# Patient Record
Sex: FEMALE | Race: WHITE | NOT HISPANIC OR LATINO | Employment: FULL TIME | URBAN - METROPOLITAN AREA
[De-identification: names, ages, dates, MRNs, and addresses within clinical notes are randomized per-mention and may not be internally consistent; named-entity substitution may affect disease eponyms.]

---

## 2017-02-13 ENCOUNTER — OFFICE VISIT - HEALTHEAST (OUTPATIENT)
Dept: FAMILY MEDICINE | Facility: CLINIC | Age: 52
End: 2017-02-13

## 2017-02-13 DIAGNOSIS — F33.0 MAJOR DEPRESSIVE DISORDER, RECURRENT EPISODE, MILD (H): ICD-10-CM

## 2017-02-13 DIAGNOSIS — N95.1 PERI-MENOPAUSAL: ICD-10-CM

## 2017-02-13 DIAGNOSIS — F98.8 ATTENTION DEFICIT DISORDER: ICD-10-CM

## 2017-03-27 ENCOUNTER — RECORDS - HEALTHEAST (OUTPATIENT)
Dept: MAMMOGRAPHY | Facility: CLINIC | Age: 52
End: 2017-03-27

## 2017-03-27 DIAGNOSIS — Z12.31 ENCOUNTER FOR SCREENING MAMMOGRAM FOR MALIGNANT NEOPLASM OF BREAST: ICD-10-CM

## 2017-04-10 ENCOUNTER — HOSPITAL ENCOUNTER (OUTPATIENT)
Dept: ULTRASOUND IMAGING | Facility: CLINIC | Age: 52
Discharge: HOME | End: 2017-04-10
Attending: NURSE PRACTITIONER

## 2017-04-10 DIAGNOSIS — N63.0 BREAST MASS: ICD-10-CM

## 2017-04-21 ENCOUNTER — OFFICE VISIT - HEALTHEAST (OUTPATIENT)
Dept: FAMILY MEDICINE | Facility: CLINIC | Age: 52
End: 2017-04-21

## 2017-04-21 ENCOUNTER — RECORDS - HEALTHEAST (OUTPATIENT)
Dept: GENERAL RADIOLOGY | Facility: CLINIC | Age: 52
End: 2017-04-21

## 2017-04-21 DIAGNOSIS — M25.562 PAIN IN LEFT KNEE: ICD-10-CM

## 2017-04-21 DIAGNOSIS — R00.2 HEART PALPITATIONS: ICD-10-CM

## 2017-04-21 DIAGNOSIS — R53.83 FATIGUE: ICD-10-CM

## 2017-04-21 DIAGNOSIS — M25.562 LEFT KNEE PAIN: ICD-10-CM

## 2017-04-21 LAB
CHOLEST SERPL-MCNC: 223 MG/DL
FASTING STATUS PATIENT QL REPORTED: YES
HDLC SERPL-MCNC: 75 MG/DL
LDLC SERPL CALC-MCNC: 134 MG/DL
TRIGL SERPL-MCNC: 72 MG/DL

## 2017-04-21 ASSESSMENT — MIFFLIN-ST. JEOR: SCORE: 1211.86

## 2017-04-24 ENCOUNTER — COMMUNICATION - HEALTHEAST (OUTPATIENT)
Dept: FAMILY MEDICINE | Facility: CLINIC | Age: 52
End: 2017-04-24

## 2017-05-03 LAB
ATRIAL RATE - MUSE: 78 BPM
DIASTOLIC BLOOD PRESSURE - MUSE: NORMAL MMHG
INTERPRETATION ECG - MUSE: NORMAL
P AXIS - MUSE: 65 DEGREES
PR INTERVAL - MUSE: 122 MS
QRS DURATION - MUSE: 72 MS
QT - MUSE: 372 MS
QTC - MUSE: 424 MS
R AXIS - MUSE: 47 DEGREES
SYSTOLIC BLOOD PRESSURE - MUSE: NORMAL MMHG
T AXIS - MUSE: 34 DEGREES
VENTRICULAR RATE- MUSE: 78 BPM

## 2017-06-09 ENCOUNTER — COMMUNICATION - HEALTHEAST (OUTPATIENT)
Dept: FAMILY MEDICINE | Facility: CLINIC | Age: 52
End: 2017-06-09

## 2017-06-09 DIAGNOSIS — F98.8 ATTENTION DEFICIT DISORDER: ICD-10-CM

## 2017-07-12 ENCOUNTER — COMMUNICATION - HEALTHEAST (OUTPATIENT)
Dept: FAMILY MEDICINE | Facility: CLINIC | Age: 52
End: 2017-07-12

## 2017-07-12 DIAGNOSIS — F33.0 MAJOR DEPRESSIVE DISORDER, RECURRENT EPISODE, MILD (H): ICD-10-CM

## 2017-10-11 ENCOUNTER — COMMUNICATION - HEALTHEAST (OUTPATIENT)
Dept: FAMILY MEDICINE | Facility: CLINIC | Age: 52
End: 2017-10-11

## 2017-10-11 DIAGNOSIS — F98.8 ATTENTION DEFICIT DISORDER: ICD-10-CM

## 2017-12-18 ENCOUNTER — COMMUNICATION - HEALTHEAST (OUTPATIENT)
Dept: FAMILY MEDICINE | Facility: CLINIC | Age: 52
End: 2017-12-18

## 2017-12-18 DIAGNOSIS — F33.0 MAJOR DEPRESSIVE DISORDER, RECURRENT EPISODE, MILD (H): ICD-10-CM

## 2017-12-28 ENCOUNTER — OFFICE VISIT - HEALTHEAST (OUTPATIENT)
Dept: FAMILY MEDICINE | Facility: CLINIC | Age: 52
End: 2017-12-28

## 2017-12-28 DIAGNOSIS — J30.9 ALLERGIC RHINITIS: ICD-10-CM

## 2017-12-28 DIAGNOSIS — F33.0 MAJOR DEPRESSIVE DISORDER, RECURRENT EPISODE, MILD (H): ICD-10-CM

## 2017-12-28 DIAGNOSIS — F98.8 ATTENTION DEFICIT DISORDER: ICD-10-CM

## 2017-12-28 ASSESSMENT — MIFFLIN-ST. JEOR: SCORE: 1189.18

## 2017-12-31 LAB
AMPHETAMINE GC/MS CONF: 3628 NG/ML
INTERPRETATION: NORMAL
MDA (ECSTASY METABOLITE)-BY GC/MS: NEGATIVE NG/ML
MDMA (ECSTASY)-BY GC/MS: NEGATIVE NG/ML
METHAMPHETAMINE GC/MS CONF: NEGATIVE NG/ML
PHENTERMINE-BY GC/MS: NEGATIVE NG/ML
PSEUDOEPHEDRINE/EPHEDRINE-BY GC/MS: NEGATIVE NG/ML

## 2018-04-03 ENCOUNTER — RECORDS - HEALTHEAST (OUTPATIENT)
Dept: MAMMOGRAPHY | Facility: CLINIC | Age: 53
End: 2018-04-03

## 2018-04-03 DIAGNOSIS — Z12.31 ENCOUNTER FOR SCREENING MAMMOGRAM FOR MALIGNANT NEOPLASM OF BREAST: ICD-10-CM

## 2018-05-10 ENCOUNTER — OFFICE VISIT - HEALTHEAST (OUTPATIENT)
Dept: FAMILY MEDICINE | Facility: CLINIC | Age: 53
End: 2018-05-10

## 2018-05-10 DIAGNOSIS — J06.9 URI (UPPER RESPIRATORY INFECTION): ICD-10-CM

## 2018-05-10 DIAGNOSIS — F98.8 ATTENTION DEFICIT DISORDER: ICD-10-CM

## 2018-05-25 ENCOUNTER — OFFICE VISIT - HEALTHEAST (OUTPATIENT)
Dept: FAMILY MEDICINE | Facility: CLINIC | Age: 53
End: 2018-05-25

## 2018-05-25 DIAGNOSIS — N89.8 VAGINAL ITCHING: ICD-10-CM

## 2018-05-25 DIAGNOSIS — R21 RASH: ICD-10-CM

## 2018-05-25 LAB
CLUE CELLS: NORMAL
TRICHOMONAS, WET PREP: NORMAL
YEAST, WET PREP: NORMAL

## 2018-05-25 ASSESSMENT — MIFFLIN-ST. JEOR: SCORE: 1193.87

## 2018-06-01 ENCOUNTER — COMMUNICATION - HEALTHEAST (OUTPATIENT)
Dept: SCHEDULING | Facility: CLINIC | Age: 53
End: 2018-06-01

## 2018-07-17 ENCOUNTER — COMMUNICATION - HEALTHEAST (OUTPATIENT)
Dept: FAMILY MEDICINE | Facility: CLINIC | Age: 53
End: 2018-07-17

## 2018-07-17 DIAGNOSIS — F33.0 MAJOR DEPRESSIVE DISORDER, RECURRENT EPISODE, MILD (H): ICD-10-CM

## 2018-10-02 ENCOUNTER — OFFICE VISIT - HEALTHEAST (OUTPATIENT)
Dept: FAMILY MEDICINE | Facility: CLINIC | Age: 53
End: 2018-10-02

## 2018-10-02 DIAGNOSIS — F98.8 ATTENTION DEFICIT DISORDER: ICD-10-CM

## 2018-10-02 DIAGNOSIS — F33.0 MAJOR DEPRESSIVE DISORDER, RECURRENT EPISODE, MILD (H): ICD-10-CM

## 2018-10-02 DIAGNOSIS — Z79.899 CONTROLLED SUBSTANCE AGREEMENT SIGNED: ICD-10-CM

## 2018-10-03 ENCOUNTER — COMMUNICATION - HEALTHEAST (OUTPATIENT)
Dept: OBGYN | Facility: CLINIC | Age: 53
End: 2018-10-03

## 2018-10-03 ENCOUNTER — COMMUNICATION - HEALTHEAST (OUTPATIENT)
Dept: LAB | Facility: CLINIC | Age: 53
End: 2018-10-03

## 2018-10-03 DIAGNOSIS — F98.8 ATTENTION DEFICIT DISORDER: ICD-10-CM

## 2019-01-25 ENCOUNTER — COMMUNICATION - HEALTHEAST (OUTPATIENT)
Dept: FAMILY MEDICINE | Facility: CLINIC | Age: 54
End: 2019-01-25

## 2019-01-25 ENCOUNTER — OFFICE VISIT - HEALTHEAST (OUTPATIENT)
Dept: FAMILY MEDICINE | Facility: CLINIC | Age: 54
End: 2019-01-25

## 2019-01-25 ENCOUNTER — AMBULATORY - HEALTHEAST (OUTPATIENT)
Dept: FAMILY MEDICINE | Facility: CLINIC | Age: 54
End: 2019-01-25

## 2019-01-25 DIAGNOSIS — N63.20 LEFT BREAST LUMP: ICD-10-CM

## 2019-01-25 ASSESSMENT — MIFFLIN-ST. JEOR: SCORE: 1153.1

## 2019-01-29 ENCOUNTER — RECORDS - HEALTHEAST (OUTPATIENT)
Dept: ADMINISTRATIVE | Facility: OTHER | Age: 54
End: 2019-01-29

## 2019-02-11 ENCOUNTER — COMMUNICATION - HEALTHEAST (OUTPATIENT)
Dept: FAMILY MEDICINE | Facility: CLINIC | Age: 54
End: 2019-02-11

## 2019-02-11 DIAGNOSIS — F98.8 ATTENTION DEFICIT DISORDER: ICD-10-CM

## 2019-02-12 ENCOUNTER — OFFICE VISIT - HEALTHEAST (OUTPATIENT)
Dept: FAMILY MEDICINE | Facility: CLINIC | Age: 54
End: 2019-02-12

## 2019-02-12 ENCOUNTER — COMMUNICATION - HEALTHEAST (OUTPATIENT)
Dept: FAMILY MEDICINE | Facility: CLINIC | Age: 54
End: 2019-02-12

## 2019-02-12 DIAGNOSIS — F98.8 ATTENTION DEFICIT DISORDER: ICD-10-CM

## 2019-02-12 LAB
AMPHETAMINES UR QL SCN: ABNORMAL
BARBITURATES UR QL: ABNORMAL
BENZODIAZ UR QL: ABNORMAL
CANNABINOIDS UR QL SCN: ABNORMAL
COCAINE UR QL: ABNORMAL
CREAT UR-MCNC: 148.9 MG/DL
METHADONE UR QL SCN: ABNORMAL
OPIATES UR QL SCN: ABNORMAL
OXYCODONE UR QL: ABNORMAL
PCP UR QL SCN: ABNORMAL

## 2019-02-12 ASSESSMENT — MIFFLIN-ST. JEOR: SCORE: 1166.53

## 2019-03-15 ENCOUNTER — COMMUNICATION - HEALTHEAST (OUTPATIENT)
Dept: FAMILY MEDICINE | Facility: CLINIC | Age: 54
End: 2019-03-15

## 2019-03-15 DIAGNOSIS — F98.8 ATTENTION DEFICIT DISORDER: ICD-10-CM

## 2019-03-20 ENCOUNTER — OFFICE VISIT - HEALTHEAST (OUTPATIENT)
Dept: FAMILY MEDICINE | Facility: CLINIC | Age: 54
End: 2019-03-20

## 2019-03-20 DIAGNOSIS — J30.81 ALLERGIC RHINITIS DUE TO ANIMALS: ICD-10-CM

## 2019-03-20 DIAGNOSIS — N87.9 DYSPLASIA OF CERVIX: ICD-10-CM

## 2019-03-20 DIAGNOSIS — F33.0 MAJOR DEPRESSIVE DISORDER, RECURRENT EPISODE, MILD (H): ICD-10-CM

## 2019-03-20 DIAGNOSIS — Z00.00 ROUTINE GENERAL MEDICAL EXAMINATION AT A HEALTH CARE FACILITY: ICD-10-CM

## 2019-03-20 DIAGNOSIS — F90.0 ATTENTION DEFICIT HYPERACTIVITY DISORDER (ADHD), PREDOMINANTLY INATTENTIVE TYPE: ICD-10-CM

## 2019-03-20 DIAGNOSIS — Z79.899 CONTROLLED SUBSTANCE AGREEMENT SIGNED: ICD-10-CM

## 2019-03-20 DIAGNOSIS — A60.00 HERPES SIMPLEX INFECTION OF GENITOURINARY SYSTEM: ICD-10-CM

## 2019-03-20 LAB
CLUE CELLS: NORMAL
TRICHOMONAS, WET PREP: NORMAL
YEAST, WET PREP: NORMAL

## 2019-03-20 ASSESSMENT — MIFFLIN-ST. JEOR: SCORE: 1177.01

## 2019-03-22 LAB
HPV SOURCE: ABNORMAL
HUMAN PAPILLOMA VIRUS 16 DNA: NEGATIVE
HUMAN PAPILLOMA VIRUS 18 DNA: NEGATIVE
HUMAN PAPILLOMA VIRUS FINAL DIAGNOSIS: ABNORMAL
HUMAN PAPILLOMA VIRUS OTHER HR: POSITIVE
SPECIMEN DESCRIPTION: ABNORMAL

## 2019-03-29 ENCOUNTER — COMMUNICATION - HEALTHEAST (OUTPATIENT)
Dept: FAMILY MEDICINE | Facility: CLINIC | Age: 54
End: 2019-03-29

## 2019-04-02 LAB
BKR LAB AP ABNORMAL BLEEDING: NO
BKR LAB AP BIRTH CONTROL/HORMONES: ABNORMAL
BKR LAB AP CERVICAL APPEARANCE: NORMAL
BKR LAB AP GYN ADEQUACY: ABNORMAL
BKR LAB AP GYN INTERPRETATION: ABNORMAL
BKR LAB AP HPV REFLEX: ABNORMAL
BKR LAB AP LMP: ABNORMAL
BKR LAB AP PATIENT STATUS: ABNORMAL
BKR LAB AP PREVIOUS ABNORMAL: ABNORMAL
BKR LAB AP PREVIOUS NORMAL: 2012
HIGH RISK?: YES
INTERPRETING LAB: ABNORMAL
PATH REPORT.COMMENTS IMP SPEC: ABNORMAL
RESULT FLAG (HE HISTORICAL CONVERSION): ABNORMAL

## 2019-04-06 ENCOUNTER — COMMUNICATION - HEALTHEAST (OUTPATIENT)
Dept: FAMILY MEDICINE | Facility: CLINIC | Age: 54
End: 2019-04-06

## 2019-05-21 ENCOUNTER — COMMUNICATION - HEALTHEAST (OUTPATIENT)
Dept: FAMILY MEDICINE | Facility: CLINIC | Age: 54
End: 2019-05-21

## 2019-05-21 DIAGNOSIS — F98.8 ATTENTION DEFICIT DISORDER: ICD-10-CM

## 2019-07-02 ENCOUNTER — OFFICE VISIT - HEALTHEAST (OUTPATIENT)
Dept: FAMILY MEDICINE | Facility: CLINIC | Age: 54
End: 2019-07-02

## 2019-07-02 ENCOUNTER — COMMUNICATION - HEALTHEAST (OUTPATIENT)
Dept: FAMILY MEDICINE | Facility: CLINIC | Age: 54
End: 2019-07-02

## 2019-07-02 DIAGNOSIS — N63.10 BREAST MASS, RIGHT: ICD-10-CM

## 2019-07-02 DIAGNOSIS — J30.81 ALLERGIC RHINITIS DUE TO ANIMALS: ICD-10-CM

## 2019-07-02 DIAGNOSIS — F98.8 ATTENTION DEFICIT DISORDER: ICD-10-CM

## 2019-07-02 ASSESSMENT — MIFFLIN-ST. JEOR: SCORE: 1175.91

## 2019-07-03 ENCOUNTER — HOSPITAL ENCOUNTER (OUTPATIENT)
Dept: MAMMOGRAPHY | Facility: CLINIC | Age: 54
Discharge: HOME | End: 2019-07-03
Attending: FAMILY MEDICINE | Admitting: RADIOLOGY

## 2019-07-03 ENCOUNTER — HOSPITAL ENCOUNTER (OUTPATIENT)
Dept: ULTRASOUND IMAGING | Facility: CLINIC | Age: 54
Discharge: HOME | End: 2019-07-03
Attending: FAMILY MEDICINE

## 2019-07-03 DIAGNOSIS — N63.10 BREAST MASS, RIGHT: ICD-10-CM

## 2019-08-07 ENCOUNTER — OFFICE VISIT - HEALTHEAST (OUTPATIENT)
Dept: FAMILY MEDICINE | Facility: CLINIC | Age: 54
End: 2019-08-07

## 2019-08-07 DIAGNOSIS — R87.610 ASCUS WITH POSITIVE HIGH RISK HPV CERVICAL: ICD-10-CM

## 2019-08-07 DIAGNOSIS — R87.810 ASCUS WITH POSITIVE HIGH RISK HPV CERVICAL: ICD-10-CM

## 2019-08-07 DIAGNOSIS — F98.8 ATTENTION DEFICIT DISORDER: ICD-10-CM

## 2019-08-07 LAB
CLUE CELLS: NORMAL
TRICHOMONAS, WET PREP: NORMAL
YEAST, WET PREP: NORMAL

## 2019-08-07 ASSESSMENT — MIFFLIN-ST. JEOR: SCORE: 1163.44

## 2019-08-15 LAB
BKR LAB AP ABNORMAL BLEEDING: NO
BKR LAB AP BIRTH CONTROL/HORMONES: NORMAL
BKR LAB AP CERVICAL APPEARANCE: NORMAL
BKR LAB AP GYN ADEQUACY: NORMAL
BKR LAB AP GYN INTERPRETATION: NORMAL
BKR LAB AP HPV REFLEX: NORMAL
BKR LAB AP LMP: NORMAL
BKR LAB AP PATIENT STATUS: NORMAL
BKR LAB AP PREVIOUS ABNORMAL: NORMAL
BKR LAB AP PREVIOUS NORMAL: NORMAL
HIGH RISK?: YES
PATH REPORT.COMMENTS IMP SPEC: NORMAL
RESULT FLAG (HE HISTORICAL CONVERSION): NORMAL

## 2019-08-16 ENCOUNTER — COMMUNICATION - HEALTHEAST (OUTPATIENT)
Dept: FAMILY MEDICINE | Facility: CLINIC | Age: 54
End: 2019-08-16

## 2019-09-22 ENCOUNTER — COMMUNICATION - HEALTHEAST (OUTPATIENT)
Dept: FAMILY MEDICINE | Facility: CLINIC | Age: 54
End: 2019-09-22

## 2019-10-09 ENCOUNTER — COMMUNICATION - HEALTHEAST (OUTPATIENT)
Dept: FAMILY MEDICINE | Facility: CLINIC | Age: 54
End: 2019-10-09

## 2019-10-09 DIAGNOSIS — F98.8 ATTENTION DEFICIT DISORDER: ICD-10-CM

## 2019-11-07 ENCOUNTER — COMMUNICATION - HEALTHEAST (OUTPATIENT)
Dept: FAMILY MEDICINE | Facility: CLINIC | Age: 54
End: 2019-11-07

## 2019-11-21 ENCOUNTER — COMMUNICATION - HEALTHEAST (OUTPATIENT)
Dept: FAMILY MEDICINE | Facility: CLINIC | Age: 54
End: 2019-11-21

## 2019-11-21 DIAGNOSIS — F98.8 ATTENTION DEFICIT DISORDER: ICD-10-CM

## 2019-12-13 ENCOUNTER — AMBULATORY - HEALTHEAST (OUTPATIENT)
Dept: FAMILY MEDICINE | Facility: CLINIC | Age: 54
End: 2019-12-13

## 2019-12-29 ENCOUNTER — COMMUNICATION - HEALTHEAST (OUTPATIENT)
Dept: FAMILY MEDICINE | Facility: CLINIC | Age: 54
End: 2019-12-29

## 2019-12-29 DIAGNOSIS — F98.8 ATTENTION DEFICIT DISORDER: ICD-10-CM

## 2020-02-04 ENCOUNTER — COMMUNICATION - HEALTHEAST (OUTPATIENT)
Dept: FAMILY MEDICINE | Facility: CLINIC | Age: 55
End: 2020-02-04

## 2020-02-04 DIAGNOSIS — F98.8 ATTENTION DEFICIT DISORDER: ICD-10-CM

## 2020-03-05 ENCOUNTER — COMMUNICATION - HEALTHEAST (OUTPATIENT)
Dept: FAMILY MEDICINE | Facility: CLINIC | Age: 55
End: 2020-03-05

## 2020-03-24 ENCOUNTER — COMMUNICATION - HEALTHEAST (OUTPATIENT)
Dept: FAMILY MEDICINE | Facility: CLINIC | Age: 55
End: 2020-03-24

## 2020-03-24 DIAGNOSIS — F98.8 ATTENTION DEFICIT DISORDER: ICD-10-CM

## 2020-03-25 ENCOUNTER — COMMUNICATION - HEALTHEAST (OUTPATIENT)
Dept: FAMILY MEDICINE | Facility: CLINIC | Age: 55
End: 2020-03-25

## 2020-04-23 ENCOUNTER — COMMUNICATION - HEALTHEAST (OUTPATIENT)
Dept: FAMILY MEDICINE | Facility: CLINIC | Age: 55
End: 2020-04-23

## 2020-04-23 DIAGNOSIS — F98.8 ATTENTION DEFICIT DISORDER: ICD-10-CM

## 2020-05-20 ENCOUNTER — COMMUNICATION - HEALTHEAST (OUTPATIENT)
Dept: FAMILY MEDICINE | Facility: CLINIC | Age: 55
End: 2020-05-20

## 2020-05-20 DIAGNOSIS — F98.8 ATTENTION DEFICIT DISORDER: ICD-10-CM

## 2020-05-21 ENCOUNTER — COMMUNICATION - HEALTHEAST (OUTPATIENT)
Dept: FAMILY MEDICINE | Facility: CLINIC | Age: 55
End: 2020-05-21

## 2020-05-21 DIAGNOSIS — F98.8 ATTENTION DEFICIT DISORDER: ICD-10-CM

## 2020-05-22 ENCOUNTER — COMMUNICATION - HEALTHEAST (OUTPATIENT)
Dept: FAMILY MEDICINE | Facility: CLINIC | Age: 55
End: 2020-05-22

## 2020-05-22 DIAGNOSIS — F98.8 ATTENTION DEFICIT DISORDER: ICD-10-CM

## 2020-05-26 ENCOUNTER — COMMUNICATION - HEALTHEAST (OUTPATIENT)
Dept: FAMILY MEDICINE | Facility: CLINIC | Age: 55
End: 2020-05-26

## 2020-05-26 DIAGNOSIS — F33.0 MAJOR DEPRESSIVE DISORDER, RECURRENT EPISODE, MILD (H): ICD-10-CM

## 2020-05-26 DIAGNOSIS — F90.0 ATTENTION DEFICIT HYPERACTIVITY DISORDER (ADHD), PREDOMINANTLY INATTENTIVE TYPE: ICD-10-CM

## 2020-06-19 ENCOUNTER — COMMUNICATION - HEALTHEAST (OUTPATIENT)
Dept: FAMILY MEDICINE | Facility: CLINIC | Age: 55
End: 2020-06-19

## 2020-06-19 DIAGNOSIS — F98.8 ATTENTION DEFICIT DISORDER: ICD-10-CM

## 2020-06-24 ENCOUNTER — COMMUNICATION - HEALTHEAST (OUTPATIENT)
Dept: FAMILY MEDICINE | Facility: CLINIC | Age: 55
End: 2020-06-24

## 2020-07-23 ENCOUNTER — COMMUNICATION - HEALTHEAST (OUTPATIENT)
Dept: FAMILY MEDICINE | Facility: CLINIC | Age: 55
End: 2020-07-23

## 2020-07-23 DIAGNOSIS — F98.8 ATTENTION DEFICIT DISORDER: ICD-10-CM

## 2020-08-03 ENCOUNTER — COMMUNICATION - HEALTHEAST (OUTPATIENT)
Dept: FAMILY MEDICINE | Facility: CLINIC | Age: 55
End: 2020-08-03

## 2020-08-03 DIAGNOSIS — F98.8 ATTENTION DEFICIT DISORDER: ICD-10-CM

## 2020-09-01 ENCOUNTER — OFFICE VISIT - HEALTHEAST (OUTPATIENT)
Dept: FAMILY MEDICINE | Facility: CLINIC | Age: 55
End: 2020-09-01

## 2020-09-01 DIAGNOSIS — N30.00 ACUTE CYSTITIS WITHOUT HEMATURIA: ICD-10-CM

## 2020-11-11 ENCOUNTER — COMMUNICATION - HEALTHEAST (OUTPATIENT)
Dept: FAMILY MEDICINE | Facility: CLINIC | Age: 55
End: 2020-11-11

## 2020-11-11 DIAGNOSIS — Z00.00 ROUTINE GENERAL MEDICAL EXAMINATION AT A HEALTH CARE FACILITY: ICD-10-CM

## 2020-11-11 DIAGNOSIS — E78.5 HYPERLIPIDEMIA LDL GOAL <130: ICD-10-CM

## 2020-11-11 DIAGNOSIS — Z20.822 EXPOSURE TO COVID-19 VIRUS: ICD-10-CM

## 2020-11-11 DIAGNOSIS — F90.0 ATTENTION DEFICIT HYPERACTIVITY DISORDER (ADHD), PREDOMINANTLY INATTENTIVE TYPE: ICD-10-CM

## 2020-11-16 ENCOUNTER — AMBULATORY - HEALTHEAST (OUTPATIENT)
Dept: LAB | Facility: CLINIC | Age: 55
End: 2020-11-16

## 2020-11-16 ENCOUNTER — COMMUNICATION - HEALTHEAST (OUTPATIENT)
Dept: FAMILY MEDICINE | Facility: CLINIC | Age: 55
End: 2020-11-16

## 2020-11-16 ENCOUNTER — AMBULATORY - HEALTHEAST (OUTPATIENT)
Dept: NURSING | Facility: CLINIC | Age: 55
End: 2020-11-16

## 2020-11-16 DIAGNOSIS — E78.5 HYPERLIPIDEMIA LDL GOAL <130: ICD-10-CM

## 2020-11-16 DIAGNOSIS — Z23 NEED FOR IMMUNIZATION AGAINST INFLUENZA: ICD-10-CM

## 2020-11-16 DIAGNOSIS — Z20.822 EXPOSURE TO COVID-19 VIRUS: ICD-10-CM

## 2020-11-16 DIAGNOSIS — Z00.00 ROUTINE GENERAL MEDICAL EXAMINATION AT A HEALTH CARE FACILITY: ICD-10-CM

## 2020-11-16 LAB
ALBUMIN SERPL-MCNC: 4.2 G/DL (ref 3.5–5)
ALP SERPL-CCNC: 53 U/L (ref 45–120)
ALT SERPL W P-5'-P-CCNC: 23 U/L (ref 0–45)
ANION GAP SERPL CALCULATED.3IONS-SCNC: 9 MMOL/L (ref 5–18)
AST SERPL W P-5'-P-CCNC: 28 U/L (ref 0–40)
BILIRUB SERPL-MCNC: 0.5 MG/DL (ref 0–1)
BUN SERPL-MCNC: 22 MG/DL (ref 8–22)
CALCIUM SERPL-MCNC: 9.2 MG/DL (ref 8.5–10.5)
CHLORIDE BLD-SCNC: 103 MMOL/L (ref 98–107)
CHOLEST SERPL-MCNC: 227 MG/DL
CO2 SERPL-SCNC: 27 MMOL/L (ref 22–31)
CREAT SERPL-MCNC: 0.93 MG/DL (ref 0.6–1.1)
ERYTHROCYTE [DISTWIDTH] IN BLOOD BY AUTOMATED COUNT: 11.2 % (ref 11–14.5)
FASTING STATUS PATIENT QL REPORTED: YES
GFR SERPL CREATININE-BSD FRML MDRD: >60 ML/MIN/1.73M2
GLUCOSE BLD-MCNC: 87 MG/DL (ref 70–125)
HCT VFR BLD AUTO: 44 % (ref 35–47)
HDLC SERPL-MCNC: 93 MG/DL
HGB BLD-MCNC: 15.1 G/DL (ref 12–16)
HIV 1+2 AB+HIV1 P24 AG SERPL QL IA: NEGATIVE
LDLC SERPL CALC-MCNC: 126 MG/DL
MCH RBC QN AUTO: 31.7 PG (ref 27–34)
MCHC RBC AUTO-ENTMCNC: 34.2 G/DL (ref 32–36)
MCV RBC AUTO: 93 FL (ref 80–100)
PLATELET # BLD AUTO: 180 THOU/UL (ref 140–440)
PMV BLD AUTO: 6.7 FL (ref 7–10)
POTASSIUM BLD-SCNC: 4.6 MMOL/L (ref 3.5–5)
PROT SERPL-MCNC: 6.7 G/DL (ref 6–8)
RBC # BLD AUTO: 4.75 MILL/UL (ref 3.8–5.4)
SODIUM SERPL-SCNC: 139 MMOL/L (ref 136–145)
TRIGL SERPL-MCNC: 39 MG/DL
WBC: 4.1 THOU/UL (ref 4–11)

## 2020-11-17 ENCOUNTER — COMMUNICATION - HEALTHEAST (OUTPATIENT)
Dept: FAMILY MEDICINE | Facility: CLINIC | Age: 55
End: 2020-11-17

## 2020-11-17 LAB — HCV AB SERPL QL IA: NEGATIVE

## 2020-11-20 ENCOUNTER — COMMUNICATION - HEALTHEAST (OUTPATIENT)
Dept: SCHEDULING | Facility: CLINIC | Age: 55
End: 2020-11-20

## 2021-01-19 ENCOUNTER — OFFICE VISIT - HEALTHEAST (OUTPATIENT)
Dept: FAMILY MEDICINE | Facility: CLINIC | Age: 56
End: 2021-01-19

## 2021-01-19 DIAGNOSIS — F98.8 ATTENTION DEFICIT DISORDER: ICD-10-CM

## 2021-01-19 DIAGNOSIS — Z12.31 VISIT FOR SCREENING MAMMOGRAM: ICD-10-CM

## 2021-01-19 DIAGNOSIS — F32.1 CURRENT MODERATE EPISODE OF MAJOR DEPRESSIVE DISORDER, UNSPECIFIED WHETHER RECURRENT (H): ICD-10-CM

## 2021-01-20 ENCOUNTER — COMMUNICATION - HEALTHEAST (OUTPATIENT)
Dept: FAMILY MEDICINE | Facility: CLINIC | Age: 56
End: 2021-01-20

## 2021-01-25 ENCOUNTER — COMMUNICATION - HEALTHEAST (OUTPATIENT)
Dept: FAMILY MEDICINE | Facility: CLINIC | Age: 56
End: 2021-01-25

## 2021-01-28 ENCOUNTER — COMMUNICATION - HEALTHEAST (OUTPATIENT)
Dept: FAMILY MEDICINE | Facility: CLINIC | Age: 56
End: 2021-01-28

## 2021-01-28 DIAGNOSIS — N30.01 ACUTE CYSTITIS WITH HEMATURIA: ICD-10-CM

## 2021-03-03 ENCOUNTER — COMMUNICATION - HEALTHEAST (OUTPATIENT)
Dept: FAMILY MEDICINE | Facility: CLINIC | Age: 56
End: 2021-03-03

## 2021-03-03 DIAGNOSIS — F90.0 ATTENTION DEFICIT HYPERACTIVITY DISORDER (ADHD), PREDOMINANTLY INATTENTIVE TYPE: ICD-10-CM

## 2021-03-03 DIAGNOSIS — F33.0 MAJOR DEPRESSIVE DISORDER, RECURRENT EPISODE, MILD (H): ICD-10-CM

## 2021-04-06 ENCOUNTER — COMMUNICATION - HEALTHEAST (OUTPATIENT)
Dept: FAMILY MEDICINE | Facility: CLINIC | Age: 56
End: 2021-04-06

## 2021-04-06 DIAGNOSIS — F98.8 ATTENTION DEFICIT DISORDER: ICD-10-CM

## 2021-04-06 DIAGNOSIS — R30.0 DYSURIA: ICD-10-CM

## 2021-04-27 ENCOUNTER — OFFICE VISIT - HEALTHEAST (OUTPATIENT)
Dept: FAMILY MEDICINE | Facility: CLINIC | Age: 56
End: 2021-04-27

## 2021-04-27 DIAGNOSIS — Z79.899 CONTROLLED SUBSTANCE AGREEMENT SIGNED: ICD-10-CM

## 2021-04-27 DIAGNOSIS — L90.0 LICHEN SCLEROSUS: ICD-10-CM

## 2021-04-27 DIAGNOSIS — N87.9 DYSPLASIA OF CERVIX: ICD-10-CM

## 2021-04-27 DIAGNOSIS — F90.0 ATTENTION DEFICIT HYPERACTIVITY DISORDER (ADHD), PREDOMINANTLY INATTENTIVE TYPE: ICD-10-CM

## 2021-04-27 DIAGNOSIS — A60.00 HERPES SIMPLEX INFECTION OF GENITOURINARY SYSTEM: ICD-10-CM

## 2021-04-27 DIAGNOSIS — F32.1 CURRENT MODERATE EPISODE OF MAJOR DEPRESSIVE DISORDER, UNSPECIFIED WHETHER RECURRENT (H): ICD-10-CM

## 2021-04-27 DIAGNOSIS — Z01.419 ENCOUNTER FOR ROUTINE GYNECOLOGICAL EXAMINATION: ICD-10-CM

## 2021-04-27 LAB
CLUE CELLS: NORMAL
TRICHOMONAS, WET PREP: NORMAL
YEAST, WET PREP: NORMAL

## 2021-04-27 ASSESSMENT — MIFFLIN-ST. JEOR: SCORE: 1183.29

## 2021-04-27 ASSESSMENT — PATIENT HEALTH QUESTIONNAIRE - PHQ9: SUM OF ALL RESPONSES TO PHQ QUESTIONS 1-9: 8

## 2021-04-28 LAB
HPV SOURCE: NORMAL
HUMAN PAPILLOMA VIRUS 16 DNA: NEGATIVE
HUMAN PAPILLOMA VIRUS 18 DNA: NEGATIVE
HUMAN PAPILLOMA VIRUS FINAL DIAGNOSIS: NORMAL
HUMAN PAPILLOMA VIRUS OTHER HR: NEGATIVE
SPECIMEN DESCRIPTION: NORMAL

## 2021-05-04 LAB
BKR LAB AP ABNORMAL BLEEDING: NO
BKR LAB AP BIRTH CONTROL/HORMONES: NORMAL
BKR LAB AP CERVICAL APPEARANCE: NORMAL
BKR LAB AP GYN ADEQUACY: NORMAL
BKR LAB AP GYN INTERPRETATION: NORMAL
BKR LAB AP HPV REFLEX: NORMAL
BKR LAB AP LMP: NORMAL
BKR LAB AP PATIENT STATUS: NORMAL
BKR LAB AP PREVIOUS ABNORMAL: NORMAL
BKR LAB AP PREVIOUS NORMAL: 2016
HIGH RISK?: NO
PATH REPORT.COMMENTS IMP SPEC: NORMAL
RESULT FLAG (HE HISTORICAL CONVERSION): NORMAL

## 2021-05-05 ENCOUNTER — COMMUNICATION - HEALTHEAST (OUTPATIENT)
Dept: OBGYN | Facility: CLINIC | Age: 56
End: 2021-05-05

## 2021-05-13 ENCOUNTER — RECORDS - HEALTHEAST (OUTPATIENT)
Dept: FAMILY MEDICINE | Facility: CLINIC | Age: 56
End: 2021-05-13

## 2021-05-27 ASSESSMENT — PATIENT HEALTH QUESTIONNAIRE - PHQ9: SUM OF ALL RESPONSES TO PHQ QUESTIONS 1-9: 8

## 2021-05-30 VITALS — HEIGHT: 66 IN | BODY MASS INDEX: 21.38 KG/M2 | WEIGHT: 133 LBS

## 2021-05-30 VITALS — BODY MASS INDEX: 22.41 KG/M2 | WEIGHT: 138 LBS

## 2021-05-30 NOTE — PROGRESS NOTES
OFFICE VISIT NOTE      Assessment & Plan   Kasia Quiroz is a 54 y.o. female with a new 1 week old right breast lump --she just had a benign left breast lump 6 months ago. We'll start with another mammogram and u/s, and go from there. She feels reasonable because we are proceeding with eval.  Refilled meds as requested.  Discussed pap, too.    Diagnoses and all orders for this visit:    Breast mass, right  -     Cancel: Mammo Diagnostic Right; Future; Expected date: 07/02/2019  -     US Breast Limited (Focal) Right; Future; Expected date: 07/02/2019  -     Mammo Diagnostic Bilateral; Future; Expected date: 07/02/2019    ADHD, Predominantly Inattentive Type  -     lisdexamfetamine (VYVANSE) 20 MG capsule; Take 1 capsule (20 mg total) by mouth every morning.  Dispense: 30 capsule; Refill: 0    Allergic rhinitis due to animals  -     fluticasone propionate (FLONASE) 50 mcg/actuation nasal spray; Use two sprays in each nostril once daily  Dispense: 16 g; Refill: 5        Lalitha Rivera MD              Subjective:   Chief Complaint:  Mass (right breast, no pain) and Follow-up (pap result)    54 y.o. female.     1) felt a breast lump last week - was surprised because she just had a mammogram (in Jan, 6 months ago) which was fine. This does not hurt. She just brushed her fingers against her breast and felt it. There is no rash, no redness, no nipple discharge. She does not think it has changed size in the past week.    2) wants meds refilled -Vyvanse and flonase.    Current Outpatient Medications   Medication Sig     FLUoxetine (PROZAC) 40 MG capsule TAKE 1 CAPSULE(40 MG) BY MOUTH DAILY     fluticasone propionate (FLONASE) 50 mcg/actuation nasal spray Use two sprays in each nostril once daily     cholecalciferol, vitamin D3, (VITAMIN D3) 2,000 unit cap Take 5,000 Units by mouth 2 (two) times a day. Take 2 capsule daily     clobetasol (TEMOVATE) 0.05 % ointment Apply to perineum twice daily as needed     lisdexamfetamine  "(VYVANSE) 20 MG capsule Take 1 capsule (20 mg total) by mouth every morning.     multivitamin (MULTIVITAMIN) per tablet Take 1 tablet by mouth 2 (two) times a day. Take 1  Tablet twice daily  Prothera Vitaprime plus 1 mg.  Biotin and 2000 IU vitamin D       PSFHx: appropriate sections of PMH completed/filled in   Tobacco Status:  She  reports that she is a non-smoker but has been exposed to tobacco smoke. She has never used smokeless tobacco.    Review of Systems:  No fever. All other systems negative except as noted above.    Objective:    BP 94/56   Pulse 92   Temp 98.9  F (37.2  C) (Oral)   Resp 12   Ht 5' 5.75\" (1.67 m)   Wt 125 lb 4 oz (56.8 kg)   LMP  (LMP Unknown)   SpO2 99%   BMI 20.37 kg/m    GENERAL: No acute distress.  Right breast - hanging appears very slightly more full lateral of the areola; with palpation, a mass is easily felt. Measured it is 2.5cm x 3cm, and it is quite firm, round    Reviewed past mammogram  Reviewed meds    Spent 40 min face to face with patient with more the 50% spent in counseling, education and coordination of care and discussing breast lumps, perimenopause, ADHD, pap smear        "

## 2021-05-31 VITALS — BODY MASS INDEX: 20.57 KG/M2 | WEIGHT: 128 LBS | HEIGHT: 66 IN

## 2021-05-31 NOTE — PROGRESS NOTES
OFFICE VISIT NOTE      Assessment & Plan   Kasia Quiroz is a 54 y.o. female here for ADHD follow up. Continue Vyvanse 20mg. Script written today. She does not take it every day (sometimes skips non-work days), thus exactly when she needs a refill varies somewhat.    Repeat pap due - and done.    Diagnoses and all orders for this visit:    ASCUS with positive high risk HPV cervical  -     Wet Prep, Vaginal  -     Gynecologic Cytology (PAP Smear)  -     HPV High Risk DNA Cervical    ADHD, Predominantly Inattentive Type  -     lisdexamfetamine (VYVANSE) 20 MG capsule; Take 1 capsule (20 mg total) by mouth every morning.  Dispense: 30 capsule; Refill: 0        Lalitha Rivera MD              Subjective:   Chief Complaint:  Follow Up    54 y.o. female.     1) ADHD - continue vyvanse. She'll let me know when she's about a week out from needing a new script, via [facility]. She is upp to date on the drug screen and med agreement signed.    2) pap - she agrees to have it redone, due to previous abnormal.    Current Outpatient Medications   Medication Sig     cholecalciferol, vitamin D3, (VITAMIN D3) 2,000 unit cap Take 5,000 Units by mouth 2 (two) times a day. Take 2 capsule daily     clobetasol (TEMOVATE) 0.05 % ointment Apply to perineum twice daily as needed     FLUoxetine (PROZAC) 40 MG capsule TAKE 1 CAPSULE(40 MG) BY MOUTH DAILY     fluticasone propionate (FLONASE) 50 mcg/actuation nasal spray Use two sprays in each nostril once daily     multivitamin (MULTIVITAMIN) per tablet Take 1 tablet by mouth 2 (two) times a day. Take 1  Tablet twice daily  Prothera Vitaprime plus 1 mg.  Biotin and 2000 IU vitamin D     lisdexamfetamine (VYVANSE) 20 MG capsule Take 1 capsule (20 mg total) by mouth every morning.       PSFHx: appropriate sections of PMH completed/filled in   Tobacco Status:  She  reports that she is a non-smoker but has been exposed to tobacco smoke. She has never used smokeless tobacco.    Review of Systems:  No  "fever.  No rash. All other systems negative except as noted above.    Objective:    /64   Pulse 98   Temp 98.2  F (36.8  C) (Oral)   Resp 16   Ht 5' 5.75\" (1.67 m)   Wt 122 lb 8 oz (55.6 kg)   SpO2 99%   BMI 19.92 kg/m    GENERAL: No acute distress.    : external genitalia without erythema or swelling, Graves speculum introduced, wet prep and pap taken.    Wet prep: neg  Pap pending    Spent 40 min face to face with patient with more the 50% spent in counseling, education and coordination of care and discussing ADHD, med, refills, pap, HPV.        "

## 2021-06-01 VITALS — WEIGHT: 127 LBS | BODY MASS INDEX: 20.62 KG/M2

## 2021-06-01 VITALS — WEIGHT: 129.08 LBS | HEIGHT: 66 IN | BODY MASS INDEX: 20.74 KG/M2

## 2021-06-02 VITALS — WEIGHT: 125.5 LBS | HEIGHT: 66 IN | BODY MASS INDEX: 20.17 KG/M2

## 2021-06-02 VITALS — WEIGHT: 124 LBS | BODY MASS INDEX: 20.14 KG/M2

## 2021-06-02 VITALS — WEIGHT: 120.08 LBS | HEIGHT: 66 IN | BODY MASS INDEX: 19.3 KG/M2

## 2021-06-02 VITALS — BODY MASS INDEX: 19.77 KG/M2 | WEIGHT: 123.04 LBS | HEIGHT: 66 IN

## 2021-06-02 NOTE — TELEPHONE ENCOUNTER
See PCP note from August 2019. Thanks.     Assessment & Plan   Kasia Quiroz is a 54 y.o. female here for ADHD follow up. Continue Vyvanse 20mg. Script written today. She does not take it every day (sometimes skips non-work days), thus exactly when she needs a refill varies somewhat.      ADHD, Predominantly Inattentive Type  -     lisdexamfetamine (VYVANSE) 20 MG capsule; Take 1 capsule (20 mg total) by mouth every morning.  Dispense: 30 capsule; Refill: 0

## 2021-06-03 VITALS — BODY MASS INDEX: 20.13 KG/M2 | WEIGHT: 125.25 LBS | HEIGHT: 66 IN

## 2021-06-03 VITALS — BODY MASS INDEX: 19.69 KG/M2 | HEIGHT: 66 IN | WEIGHT: 122.5 LBS

## 2021-06-03 NOTE — TELEPHONE ENCOUNTER
Attempted call, no answer. LVM to return call if interested in flu vaccine this season. If patient calls back, please assist in scheduling appt. Thank you.

## 2021-06-03 NOTE — TELEPHONE ENCOUNTER
Kasia Quiroz is a 54 y.o. female here for ADHD follow up. Continue Vyvanse 20mg. Script written today. She does not take it every day (sometimes skips non-work days), thus exactly when she needs a refill varies somewhat.     Repeat pap due - and done.     Diagnoses and all orders for this visit:     ASCUS with positive high risk HPV cervical  -     Wet Prep, Vaginal  -     Gynecologic Cytology (PAP Smear)  -     HPV High Risk DNA Cervical     ADHD, Predominantly Inattentive Type  -     lisdexamfetamine (VYVANSE) 20 MG capsule; Take 1 capsule (20 mg total) by mouth every morning.  Dispense: 30 capsule; Refill: 0

## 2021-06-04 ENCOUNTER — RECORDS - HEALTHEAST (OUTPATIENT)
Dept: MAMMOGRAPHY | Facility: CLINIC | Age: 56
End: 2021-06-04

## 2021-06-04 ENCOUNTER — OFFICE VISIT - HEALTHEAST (OUTPATIENT)
Dept: FAMILY MEDICINE | Facility: CLINIC | Age: 56
End: 2021-06-04

## 2021-06-04 DIAGNOSIS — Z12.31 ENCOUNTER FOR SCREENING MAMMOGRAM FOR MALIGNANT NEOPLASM OF BREAST: ICD-10-CM

## 2021-06-04 DIAGNOSIS — R87.810 ASCUS WITH POSITIVE HIGH RISK HPV CERVICAL: ICD-10-CM

## 2021-06-04 DIAGNOSIS — Z00.00 ROUTINE GENERAL MEDICAL EXAMINATION AT A HEALTH CARE FACILITY: ICD-10-CM

## 2021-06-04 DIAGNOSIS — F90.0 ATTENTION DEFICIT HYPERACTIVITY DISORDER (ADHD), PREDOMINANTLY INATTENTIVE TYPE: ICD-10-CM

## 2021-06-04 DIAGNOSIS — F32.1 CURRENT MODERATE EPISODE OF MAJOR DEPRESSIVE DISORDER, UNSPECIFIED WHETHER RECURRENT (H): ICD-10-CM

## 2021-06-04 DIAGNOSIS — Z79.899 CONTROLLED SUBSTANCE AGREEMENT SIGNED: ICD-10-CM

## 2021-06-04 DIAGNOSIS — E05.90 HYPERTHYROIDISM: ICD-10-CM

## 2021-06-04 DIAGNOSIS — R87.610 ASCUS WITH POSITIVE HIGH RISK HPV CERVICAL: ICD-10-CM

## 2021-06-04 DIAGNOSIS — A60.00 HERPES SIMPLEX INFECTION OF GENITOURINARY SYSTEM: ICD-10-CM

## 2021-06-04 ASSESSMENT — MIFFLIN-ST. JEOR: SCORE: 1203.96

## 2021-06-04 NOTE — TELEPHONE ENCOUNTER
2nd attempted call for flu vaccine. No answe, LVM to please return call to clinic in order to schedule vaccine adminstration.

## 2021-06-04 NOTE — TELEPHONE ENCOUNTER
ADHD, Predominantly Inattentive Type  -     lisdexamfetamine (VYVANSE) 20 MG capsule; Take 1 capsule (20 mg total) by mouth every morning.  Dispense: 30 capsule; Refill: 0

## 2021-06-05 VITALS
HEART RATE: 79 BPM | RESPIRATION RATE: 18 BRPM | BODY MASS INDEX: 21.58 KG/M2 | OXYGEN SATURATION: 99 % | WEIGHT: 129.5 LBS | SYSTOLIC BLOOD PRESSURE: 106 MMHG | HEIGHT: 65 IN | DIASTOLIC BLOOD PRESSURE: 60 MMHG | TEMPERATURE: 98 F

## 2021-06-06 ENCOUNTER — HEALTH MAINTENANCE LETTER (OUTPATIENT)
Age: 56
End: 2021-06-06

## 2021-06-07 NOTE — TELEPHONE ENCOUNTER
Last office visit:   Last refill: 03/25/2020  Last lab check: 04/2017 [facility]   Next appointment: None.     Chart reviewed. Please review findings below.     Assessment & Plan   Kasia Quiroz is a 54 y.o. female here for ADHD follow up. Continue Vyvanse 20mg. Script written today. She does not take it every day (sometimes skips non-work days), thus exactly when she needs a refill varies somewhat.     ADHD, Predominantly Inattentive Type  -     lisdexamfetamine (VYVANSE) 20 MG capsule; Take 1 capsule (20 mg total) by mouth every morning.  Dispense: 30 capsule; Refill: 0

## 2021-06-07 NOTE — TELEPHONE ENCOUNTER
Controlled Substance Refill Request  Medication Name:   Requested Prescriptions     Pending Prescriptions Disp Refills     lisdexamfetamine (VYVANSE) 20 MG capsule 30 capsule 0     Sig: Take 1 capsule (20 mg total) by mouth every morning.     Date Last Fill: 3/25/20  [facility]ed [facility]: [facility]  Submit electronically to pharmacy  Controlled Substance Agreement on file:   Encounter-Level CSA Scan Date - 05/10/2018:    Scan on 5/10/2018:  [facility]           Encounter-Level CSA Scan Date - 02/13/2017:    Scan on 2/14/2017 11:22 AM           Encounter-Level CSA Scan Date - 12/08/2015:    Scan on 12/8/2015:  [facility]        Last office visit:  8/7/19

## 2021-06-08 NOTE — TELEPHONE ENCOUNTER
Last office visit:   Last refill: 03/25/2020  Last lab check: 04/2017 [facility]   Next appointment: None.      Chart reviewed. Please review findings below.      Assessment & Plan   Kasia Quiroz is a 54 y.o. female here for ADHD follow up. Continue Vyvanse 20mg. Script written today. She does not take it every day (sometimes skips non-work days), thus exactly when she needs a refill varies somewhat.     ADHD, Predominantly Inattentive Type  -     lisdexamfetamine (VYVANSE) 20 MG capsule; Take 1 capsule (20 mg total) by mouth every morning.  Dispense: 30 capsule; Refill: 0

## 2021-06-08 NOTE — PROGRESS NOTES
Subjective   Chief Complaint:  Medication Management (Vyvanse)    HPI:   Kasia Quiroz is a 51 y.o. female who presents for medication check.      She is here today for medication check for Vyvanse and fluoxetine.      ADHD:  On Vyvanse for seven years.  The medication continues to be effective for her with increasing concentration.  She denies side effects.  Due for CSA and drug screen today.      Depression:  Fluoxetine 40mg.  Denies changes in mood. PHQ-9 = 1    Vicky-menopausal symptoms:  Has not had a period for four months.  Denies vasomotor symptoms but has noted drying of skin and more forgetfulness.  No difficulty with word finding, no interference with daily tasks and activities.  She would like to discuss non-hormonal options, supplements for treatment    PMH:   Patient Active Problem List   Diagnosis     Herpes Simplex Type II     Cervical Dysplasia     Menorrhagia     Mild Recurrent Major Depression     ADHD, Predominantly Inattentive Type     Vitamin D Deficiency     Allergic rhinitis     Mucus in stool     Controlled substance agreement signed       No past medical history on file.    Current Medications:   Current Outpatient Prescriptions on File Prior to Visit   Medication Sig Dispense Refill     cholecalciferol, vitamin D3, (VITAMIN D3) 2,000 unit cap Take 5,000 Units by mouth 2 (two) times a day. Take 2 capsule daily       FLUoxetine (PROZAC) 40 MG capsule Take 1 capsule (40 mg total) by mouth daily. Take one capsule by mouth one time daily 90 capsule 1     fluticasone (FLONASE) 50 mcg/actuation nasal spray Use two sprays in each nostril once daily 16 g 5     multivitamin (MULTIVITAMIN) per tablet Take 1 tablet by mouth 2 (two) times a day. Take 1  Tablet twice daily  Prothera Vitaprime plus 1 mg.  Biotin and 2000 IU vitamin D       No current facility-administered medications on file prior to visit.        Allergies:  has No Known Allergies.    SH/FH:  Social History and Family History reviewed  and updated.   Tobacco Status:  She  reports that she has never smoked. She does not have any smokeless tobacco history on file.    Review of Systems:  A complete head to toe ROS is negative unless otherwise noted in HPI    Objective   There were no vitals filed for this visit.  Wt Readings from Last 3 Encounters:   10/27/16 134 lb (60.8 kg)   07/20/16 134 lb (60.8 kg)   12/08/15 128 lb (58.1 kg)       Physical Exam:  GENERAL: Alert, cooperative, well-appearing female .   PSYCH: Pleasant mood, affect appropriate.  Good judgment and insight.  Intact recent and remote memory.  Good eye contact.    Labs:    No results found for this or any previous visit (from the past 168 hour(s)).    Assessment & Plan   1. ADHD, Predominantly Inattentive Type:  CSA signed, drug screen today.  Vyvanse refilled for three months, then call for next three months refills.   - Drug Abuse 1+, Urine  - lisdexamfetamine (VYVANSE) 20 MG capsule; Take 1 capsule (20 mg total) by mouth daily.  Dispense: 30 capsule; Refill: 0  - lisdexamfetamine (VYVANSE) 20 MG capsule; Take 1 capsule (20 mg total) by mouth every morning.  Dispense: 30 capsule; Refill: 0  - lisdexamfetamine (VYVANSE) 20 MG capsule; Take 1 capsule (20 mg total) by mouth every morning.  Dispense: 30 capsule; Refill: 0    2. Mild Recurrent Major Depression: Well controlled on fluoxetine    3. Vicky-menopausal:  Information provided on non-hormonal options for perimenopausal symptoms.      Nunu Rutherford, RIVAS

## 2021-06-08 NOTE — TELEPHONE ENCOUNTER
Last office visit:   Last refill: 03/25/2020  Last lab check: 04/2017 [facility]   Next appointment: None.      Chart reviewed. Please review findings below.      Assessment & Plan   Kasia Quiroz is a 54 y.o. female here for ADHD follow up. Continue Vyvanse 20mg. Script written today. She does not take it every day (sometimes skips non-work days), thus exactly when she needs a refill varies somewhat.     ADHD, Predominantly Inattentive Type  -     lisdexamfetamine (VYVANSE) 20 MG capsule; Take 1 capsule (20 mg total) by mouth every morning.  Dispense: 30 capsule; Refill: 0

## 2021-06-08 NOTE — TELEPHONE ENCOUNTER
Controlled Substance Refill Request  Medication Name:   Requested Prescriptions     Pending Prescriptions Disp Refills     lisdexamfetamine (VYVANSE) 20 MG capsule 30 capsule 0     Sig: Take 1 capsule (20 mg total) by mouth every morning.     Date Last Fill: 5/22/20  Requested [facility]: [facility]  Submit electronically to pharmacy  Controlled Substance Agreement on file:   Encounter-Level CSA Scan Date - 05/10/2018:    Scan on 5/10/2018:  [facility]           Encounter-Level CSA Scan Date - 02/13/2017:    Scan on 2/14/2017 11:22 AM           Encounter-Level CSA Scan Date - 12/08/2015:    Scan on 12/8/2015:  [facility]        Last office visit:  8/7/19  Christa Hernandez RN, MA  [facility]    Triage Nurse Advisor

## 2021-06-08 NOTE — TELEPHONE ENCOUNTER
Controlled Substance Refill Request  Medication Name:   Requested Prescriptions     Pending Prescriptions Disp Refills     lisdexamfetamine (VYVANSE) 20 MG capsule 30 capsule 0     Sig: Take 1 capsule (20 mg total) by mouth every morning.     Date Last Fill: 5/22/20  [facility]ed [facility]: [facility]  Submit electronically to pharmacy  Controlled Substance Agreement on file:   Encounter-Level CSA Scan Date - 05/10/2018:    Scan on 5/10/2018:  [facility]           Encounter-Level CSA Scan Date - 02/13/2017:    Scan on 2/14/2017 11:22 AM           Encounter-Level CSA Scan Date - 12/08/2015:    Scan on 12/8/2015:  [facility]        Last office visit:  8/7/19  Christa Hernanedz RN, MA  [facility]    Triage Nurse Advisor

## 2021-06-08 NOTE — TELEPHONE ENCOUNTER
Controlled Substance Refill Request  Medication Name:   Requested Prescriptions     Pending Prescriptions Disp Refills     lisdexamfetamine (VYVANSE) 20 MG capsule 30 capsule 0     Sig: Take 1 capsule (20 mg total) by mouth every morning.     Date Last Fill: 4/23/20  [facility]ed [facility]  Submit electronically to pharmacy  Controlled Substance Agreement on file:   Encounter-Level CSA Scan Date - 05/10/2018:    Scan on 5/10/2018:  [facility]           Encounter-Level CSA Scan Date - 02/13/2017:    Scan on 2/14/2017 11:22 AM           Encounter-Level CSA Scan Date - 12/08/2015:    Scan on 12/8/2015:  [facility]        Last office visit:  8/7/19

## 2021-06-09 NOTE — TELEPHONE ENCOUNTER
Controlled Substance Refill Request  Medication Name:   Requested Prescriptions     Pending Prescriptions Disp Refills     lisdexamfetamine (VYVANSE) 20 MG capsule 30 capsule 0     Sig: Take 1 capsule (20 mg total) by mouth every morning.     Date Last Fill: 5/22/20  [facility]ed [facility]  Submit electronically to pharmacy  Controlled Substance Agreement on file:   Encounter-Level CSA Scan Date - 05/10/2018:    Scan on 5/10/2018:  [facility]           Encounter-Level CSA Scan Date - 02/13/2017:    Scan on 2/14/2017 11:22 AM           Encounter-Level CSA Scan Date - 12/08/2015:    Scan on 12/8/2015:  [facility]        Last office visit:  8/7/19  Christa Hernandez RN, MA  [facility]    Triage Nurse Advisor

## 2021-06-09 NOTE — TELEPHONE ENCOUNTER
Controlled Substance Refill Request  Medication Name:   Requested Prescriptions     Pending Prescriptions Disp Refills     lisdexamfetamine (VYVANSE) 20 MG capsule 30 capsule 0     Sig: Take 1 capsule (20 mg total) by mouth every morning.     Date Last Fill: 6/24/20  [facility]ed [facility]  Submit electronically to pharmacy  Controlled Substance Agreement on file:   Encounter-Level CSA Scan Date - 05/10/2018:    Scan on 5/10/2018:  [facility]           Encounter-Level CSA Scan Date - 02/13/2017:    Scan on 2/14/2017 11:22 AM           Encounter-Level CSA Scan Date - 12/08/2015:    Scan on 12/8/2015:  [facility]        Last office visit:  8/7/19  Christa Hernandez RN, MA  [facility]    Triage Nurse Advisor

## 2021-06-09 NOTE — TELEPHONE ENCOUNTER
Controlled Substance Refill Request  Medication Name:   Requested Prescriptions     Pending Prescriptions Disp Refills     lisdexamfetamine (VYVANSE) 20 MG capsule 30 capsule 0     Sig: Take 1 capsule (20 mg total) by mouth every morning.     Date Last Fill: 6/24/20  Requested [facility]: [facility]  Submit electronically to pharmacy  Controlled Substance Agreement on file:   Encounter-Level CSA Scan Date - 05/10/2018:    Scan on 5/10/2018:  [facility]           Encounter-Level CSA Scan Date - 02/13/2017:    Scan on 2/14/2017 11:22 AM           Encounter-Level CSA Scan Date - 12/08/2015:    Scan on 12/8/2015:  [facility]        Last office visit:  8/7/19  Christa Hernandez RN, MA  [facility]    Triage Nurse Advisor

## 2021-06-10 NOTE — TELEPHONE ENCOUNTER
Last office visit:   Last ordered: 05/26/20 #30  Last lab check: 02/12/2019 [facility]   Next appointment: NONE     Chart reviewed. Please review findings below.     Assessment & Plan   Kasia Quiroz is a 54 y.o. female here for ADHD follow up. Continue Vyvanse 20mg. Script written today. She does not take it every day (sometimes skips non-work days), thus exactly when she needs a refill varies somewhat.     Repeat pap due - and done.     Diagnoses and all orders for this visit:     ASCUS with positive high risk HPV cervical  -     Wet Prep, Vaginal  -     Gynecologic Cytology (PAP Smear)  -     HPV High Risk DNA Cervical     ADHD, Predominantly Inattentive Type  -     lisdexamfetamine (VYVANSE) 20 MG capsule; Take 1 capsule (20 mg total) by mouth every morning.  Dispense: 30 capsule; Refill: 0          Lalitha Rivera MD

## 2021-06-10 NOTE — PROGRESS NOTES
[facility]  FAMILY  MEDICINE  OFFICE  VISIT     ASSESSMENT & PLAN     Diagnoses and all orders for this visit:    Heart palpitations  EKG did not show any specific ST-T wave abnormalities, no sign of heart hypertrophy.  -     Lipid Cascade FASTING  -     Magnesium  -     HM2(CBC w/o Differential)  -     Lyme Antibody [place]  -     Electrocardiogram Perform - Clinic; Future  -     Electrocardiogram Perform - Clinic    Left knee pain  -     XR Knee Left Plus Sunrise VW; Future; Expected date: 4/21/17  Minimal to mild degenerative changes, but otherwise no acute finding.  She Prefers to start with some home stretching exercises, if not improving, consider physical therapy.  Fatigue  Differential l diagnosis discussed, lab work ordered to rule out any secondary causes.  -     High Sensitivity C-Reactive Protein(hsCR  -     Thyroid Stimulating Hormone (TSH)  -     Lipid Cascade FASTING  -     Magnesium  -     HM2(CBC w/o Differential)  -     Lyme Antibody [place]  -     Thyroid Peroxidase Antibody    We also discussed about possibly tapering Vyvanse and even stopping down the road, she was initially prescribed that for depression by her psychiatrist,has  been on that for 4 years now.      SUBJECTIVE   No specialty comments available.  Ex patient of Dr. Modi, multiple issues.  1 heart palpitation, has been experiencing that for a couple of months, usually related to position changes like lying down or getting up.  Chest pain or shortness of breath.  Does not seem to be persistent all the time, lasting a few seconds to a minute.  Does not seem to be altering her quality of life.  2.  left knee pain, mostly medial, throbbing type of pain, sometimes exacerbated with activities, but no significant to interfere with her daily living.  She is planning on starting a running program.  No previous or recent trauma.  Occasional high sensitivity in the  "shin area.  3.  Chronic fatigue, persistent for several months, so she is planning on starting a running program she like to make sure that she does not have thyroid disease or anything else.  Daughter and mother has Hashimoto's thyroiditis.  She has not had any cold or hot intolerance.  Tick bite.  OBJECTIVE    /60 (Patient Site: Right Arm)  Pulse 76  Temp 98  F (36.7  C) (Oral)   Resp 14  Ht 5' 5.8\" (1.671 m)  Wt 133 lb (60.3 kg)  LMP 03/20/2017  SpO2 99%  BMI 21.6 kg/m2  Physical Exam   Constitutional: She appears well-developed and well-nourished.   Eyes: Conjunctivae and EOM are normal. Pupils are equal, round, and reactive to light. Right eye exhibits no discharge. Left eye exhibits no discharge.   Neck: No thyromegaly present.   Cardiovascular: Normal rate, regular rhythm and normal heart sounds.    No murmur heard.  Pulmonary/Chest: Effort normal and breath sounds normal. No respiratory distress. She has no wheezes. She has no rales.   Abdominal: Soft. Bowel sounds are normal. There is tenderness.   Musculoskeletal: Normal range of motion. She exhibits tenderness (minimal tenderness left medial knee,no swollen,negative Mc garvin,Lachman's,thessaly test).   .   Lymphadenopathy:     She has no cervical adenopathy.   Neurological: She is alert. She has normal reflexes.   Skin: Skin is warm and dry. No rash noted.   Psychiatric: She has a normal mood and affect.     Nicolas Seals MD  Family Medicine, [facility]   This transcription uses voice recognition software, which may contain typographical errors. Chief Complaint:    Chief Complaint   Patient presents with     Shortness of Breath     X CRISTIAN - JV-1     Concerns     \"HEART PALPITATIONS\" INTERMITTENT X CRISTIAN     Medication Refill     VYVANCE, PROZAC     Concerns     \"CONCERNED ABOUT WORKING OUT\"     Kasia Quiroz is a 52 y.o. female.  Updated [facility]        Health Maintenance Due   Topic Date Due     DEPRESSION FOLLOW UP  " "1966     INFLUENZA VACCINE RULE BASED (1) 08/01/2016     PAP SMEAR  06/14/2017       Patient Active Problem List   Diagnosis     Herpes Simplex Type II     Cervical Dysplasia     Mild Recurrent Major Depression     ADHD, Predominantly Inattentive Type     Vitamin D Deficiency     Allergic rhinitis     Controlled substance agreement signed     No past medical history on file.    Current Outpatient Prescriptions   Medication Sig Dispense Refill     cholecalciferol, vitamin D3, (VITAMIN D3) 2,000 unit cap Take 5,000 Units by mouth 2 (two) times a day. Take 2 capsule daily       FLUoxetine (PROZAC) 40 MG capsule Take 1 capsule (40 mg total) by mouth daily. Take one capsule by mouth one time daily 90 capsule 1     fluticasone (FLONASE) 50 mcg/actuation nasal spray Use two sprays in each nostril once daily 16 g 5     lisdexamfetamine (VYVANSE) 20 MG capsule Take 1 capsule (20 mg total) by mouth every morning. 30 capsule 0     multivitamin (MULTIVITAMIN) per tablet Take 1 tablet by mouth 2 (two) times a day. Take 1  Tablet twice daily  Prothera Vitaprime plus 1 mg.  Biotin and 2000 IU vitamin D       No current facility-administered medications for this visit.      Allergies as of 04/21/2017     (No Known Allergies)     ROS    \"Review of systems complete head to toe is otherwise negative.\"}  OTHER     Wt Readings from Last 3 Encounters:   04/21/17 133 lb (60.3 kg)   02/13/17 138 lb (62.6 kg)   10/27/16 134 lb (60.8 kg)       PHQ-9:  PHQ-2 Total Score: 0 (2/13/2017  8:00 AM)  The following are part of a depression follow up plan for the patient:  under care of mental health counselor  No Follow-up on file.    Social History   Substance Use Topics     Smoking status: Never Smoker     Smokeless tobacco: None     Alcohol use None       Family History   Problem Relation Age of Onset     Acute Myocardial Infarction Father      Breast cancer Maternal Aunt        Past Surgical History:   Procedure Laterality Date     [place] "  DELIVERY ONLY  ,     Description:  Section;  Recorded: 2009;  Comments: x 2 (for breech, repeat)       Social History     Social History Narrative       RESULTS (Lab/Rad)     Recent Results (from the past 168 hour(s))   Electrocardiogram Perform - Clinic   Result Value Ref Range    SYSTOLIC BLOOD PRESSURE  mmHg    DIASTOLIC BLOOD PRESSURE  mmHg    VENTRICULAR RATE 78 BPM    ATRIAL RATE 78 BPM    P-R INTERVAL 122 ms    QRS DURATION 72 ms    Q-T INTERVAL 372 ms    QTC CALCULATION (BEZET) 424 ms    P Axis 65 degrees    R AXIS 47 degrees    T AXIS 34 degrees    MUSE DIAGNOSIS       Normal sinus rhythm  Normal ECG  No previous ECGs available     HM2(CBC w/o Differential)   Result Value Ref Range    WBC 6.0 4.0 - 11.0 thou/uL    RBC 5.07 3.80 - 5.40 mill/uL    Hemoglobin 15.5 12.0 - 16.0 g/dL    Hematocrit 46.1 35.0 - 47.0 %    MCV 91 80 - 100 fL    MCH 30.6 27.0 - 34.0 pg    MCHC 33.7 32.0 - 36.0 g/dL    RDW 12.6 11.0 - 14.5 %    Platelets 224 140 - 440 thou/uL    MPV 6.8 (L) 7.0 - 10.0 fL     Xr Knee Left Plus Sunrise Vw    Result Date: 2017  XR KNEE LEFT PLUS SUNRISE VW 2017 8:33 AM INDICATION: Left medial knee pain x 2 months. COMPARISON: None. FINDINGS: Minimal degenerative narrowing medial and lateral compartments. Knees otherwise negative. No joint effusion. This report was electronically interpreted by: Dr. Hector Mullen MD ON 2017 at 09:10    Mammo Screening Bilateral    Result Date: 3/28/2017  FULL-FIELD DIGITAL SCREENING MAMMOGRAM 3/27/2017 8:48 AM INDICATION: Screening. COMPARISON: None. FINDINGS: The breast tissue is heterogeneously dense which may compromise mammography. The left breast appears negative. In the right breast in the retroareolar tissues of the upper outer quadrant in the anterior depth there is a partially obscured circumscribed mass. Further evaluation with breast ultrasound is recommended. CAD analysis was performed and used in the interpretation  of this exam. ACR BI-RADS Category 0: Incomplete: Need Additional Imaging Evaluation and/or Prior Mammograms for Comparison. RADIOLOGY WILL CONTACT THE PATIENT FOR ADDITIONAL IMAGING.     Us Breast Limited (focal) Right    Result Date: 4/10/2017  US BREAST LIMITED (FOCAL) RIGHT 4/10/2017 8:23 AM INDICATION: addtional imaging right breast COMPARISON: Right breast mass on baseline screening mammogram. ULTRASOUND FINDINGS: Targeted ultrasound of the right retroareolar region demonstrates an 11:00 zone 1 position 16 x 14 x 6 mm simple cyst. There are several adjacent smaller simple cyst. No solid mass seen.     Right breast cysts. ACR BI-RADS Category 2: Benign. Results given to the patient. Recommend annual screening mammogram.

## 2021-06-11 NOTE — PROGRESS NOTES
"Kasia Quiroz is a 55 y.o. female who is being evaluated via a billable video visit.      The patient has been notified of following:     \"This video visit will be conducted via a call between you and your physician/provider. We have found that certain health care needs can be provided without the need for an in-person physical exam.  This service lets us provide the care you need with a video conversation.  If a prescription is necessary we can send it directly to your pharmacy.  If lab work is needed we can place an order for that and you can then stop by our lab to have the test done at a later time.    Video visits are billed at different rates depending on your insurance coverage. Please reach out to your insurance provider with any questions.    If during the course of the call the physician/provider feels a video visit is not appropriate, you will not be charged for this service.\"    Patient has given verbal consent to a Video visit? Yes  How would you like to obtain your AVS? AVS Preference: [email or web address]  If dropped by the video visit, the video invitation should be sent to: Text to cell phone: [phone]  Will anyone else be joining your video visit? No      Video Start Time: 1:14 PM    Additional provider notes: nose mass and other    Urine - 5 days ago woke with unusual urine symptoms - pain with urination, etc. Wants treatment.    Nose - purplish-tinted spot on nose x 6 months. It has grown - become fleshier in 6 mon. Soft. She does not need a referral to see her dermatologist.    A/P: UTI - treat with nitrofurantoin x 5 days. Let us know if symptoms do not go away  Skin spot - see derm to diagnose it    Video-Visit Details    Type of service:  Video Visit    Video End Time (time video stopped): 1:30 PM  Originating Location (pt. Location): Home    Distant Location (provider location):  [facility] MEDICINE/OB      Platform used for Video Visit: Sara Rivera MD    "

## 2021-06-14 ENCOUNTER — COMMUNICATION - HEALTHEAST (OUTPATIENT)
Dept: FAMILY MEDICINE | Facility: CLINIC | Age: 56
End: 2021-06-14

## 2021-06-14 DIAGNOSIS — F98.8 ATTENTION DEFICIT DISORDER: ICD-10-CM

## 2021-06-14 NOTE — TELEPHONE ENCOUNTER
Prior Authorization Request  Who s requesting:  Pharmacy  [id number] [address] [facility]  Medication Name: lisdexamfetamine (VYVANSE) 20 MG capsule  Insurance Plan: MEDICA    Insurance Member ID Number:  [id number]  CoverMyMeds Key: N/A  Informed patient that prior authorizations can take up to 10 business days for response:   No  Okay to leave a detailed message: No

## 2021-06-14 NOTE — PROGRESS NOTES
OFFICE VISIT NOTE      Assessment & Plan   Kasia Quiroz is a 55 y.o. female who really wanted to taper off the Vyvanse she takes, but she's unable to. Between work changes due to Covid (working online and not with people), social isolation, winter, and frustration/depression, she realizes this is not the time to do it. She agrees to resume it  F/u in 3-5 months for pap and depression check    She'll reach out sooner if her depression continues to spiral down.    Diagnoses and all orders for this visit:    Visit for screening mammogram  -     Mammo Screening Bilateral; Future; Expected date: 01/19/2021    ADHD, Predominantly Inattentive Type  -     lisdexamfetamine (VYVANSE) 20 MG capsule; Take 1 capsule (20 mg total) by mouth every morning.  Dispense: 30 capsule; Refill: 0    Current moderate episode of major depressive disorder, unspecified whether recurrent (H)    mammogram - due anytime. Check to see if insurance will cover a 3D mammo  Pap is due. Last 2 normal but h/o abnormal. F/u in a few months for this.    Lalitha Rivera MD              Subjective:   Chief Complaint:  No chief complaint on file.    55 y.o. female.     1) unable to taper off Vyvanse. She's finally giving in to this reality. She's frustrated with herself but she cannot do the work she has. She states she typically hides her emotions from her  but lately he's been commenting on her. She knows then, that she has to do something.  She's taking her supplements, exercising, sleeping lots (and her sleep is good). She's eating well and has gotten her 19 yo son to eat better --that is a strong positive!    2) mood is low    She knows she needs a mammogram soon  She wonders when her next pap is due?    Current Outpatient Medications   Medication Sig     cholecalciferol, vitamin D3, (VITAMIN D3) 2,000 unit cap Take 5,000 Units by mouth 2 (two) times a day. Take 2 capsule daily     clobetasol (TEMOVATE) 0.05 % ointment Apply to perineum twice  daily as needed     FLUoxetine (PROZAC) 40 MG capsule TAKE 1 CAPSULE(40 MG) BY MOUTH DAILY     fluticasone propionate (FLONASE) 50 mcg/actuation nasal spray Use two sprays in each nostril once daily     lisdexamfetamine (VYVANSE) 20 MG capsule Take 1 capsule (20 mg total) by mouth every morning.     multivitamin (MULTIVITAMIN) per tablet Take 1 tablet by mouth 2 (two) times a day. Take 1  Tablet twice daily  Prothera Vitaprime plus 1 mg.  Biotin and 2000 IU vitamin D       PSFHx: appropriate sections of PMH completed/filled in   Tobacco Status:  She  reports that she is a non-smoker but has been exposed to tobacco smoke. She has never used smokeless tobacco.    Review of Systems:  No fever.  No diarrhea. All other systems negative except as noted above.    Objective:    There were no vitals taken for this visit.  GENERAL: No acute distress.  Mood: low  Affect: near crying at times    No other exam due to video visit

## 2021-06-14 NOTE — TELEPHONE ENCOUNTER
I wrote to the patient and asked what she wants to do. Will keep this encounter open until I hear back from her.

## 2021-06-14 NOTE — TELEPHONE ENCOUNTER
Last office visit: 09/01/2020  Last ordered: 09/01/2020  nitrofurantoin, macrocrystal-monohydrate, (MACROBID) 100 MG capsule  Last lab check: 12/10/2018  Next appointment: 04/27/2021    Chart reviewed. Please review findings below.     Additional provider notes: nose mass and other     Urine - 5 days ago woke with unusual urine symptoms - pain with urination, etc. Wants treatment.     Nose - purplish-tinted spot on nose x 6 months. It has grown - become fleshier in 6 mon. Soft. She does not need a referral to see her dermatologist.     A/P: UTI - treat with nitrofurantoin x 5 days. Let us know if symptoms do not go away  Skin spot - see derm to diagnose it

## 2021-06-14 NOTE — TELEPHONE ENCOUNTER
Vyvanse 20 MG cap is not covered.     Covered formulary alternatives may include:     DEXTROAMPHETAMINE-AMPHETAMINE 30 MG CP24    DEXMETHYLPHENIDATE 10 MG BP50

## 2021-06-15 NOTE — PROGRESS NOTES
OFFICE VISIT - FAMILY MEDICINE     ASSESSMENT AND PLAN     1. Attention deficit disorder  Pain Clinic Survey, Urine    Pain Clinic Survey, Urine   2. Allergic rhinitis  fluticasone (FLONASE) 50 mcg/actuation nasal spray   3. ADHD, Predominantly Inattentive Type  lisdexamfetamine (VYVANSE) 20 MG capsule    lisdexamfetamine (VYVANSE) 20 MG capsule    lisdexamfetamine (VYVANSE) 20 MG capsule   Attention deficit disorder, appears stable on current treatment, she is able to focus, and is also helping for her mood.  Continue the same, but he also had a discussion about timing of tapering as she continues to improve.  Depression appears stable on current treatment with Prozac 40 mg daily, continue the same and follow-up with primary care physician on a regular basis.    CHIEF COMPLAINT   Medication Refill (VYVANSE/PROZAC, 90 DAY SUPPLY/FLONASE)    HPI   Kasia Quiroz is a 52 y.o. female.  Updated [facility] - Needs PAP    Patient has been on ADHD medication for over 5 years now, initially was started on a different medication, then was switched to Vyvanse is about 5 years ago, she has been responding well, focusing, following through and mood has improved at home and at work.  Has been tolerating the medication well, has been on 20 mg daily for a few years now, denies any insomnia, no change in appetite, no chest pain.  She was seen back in April for palpitation, EKG and lab works were all unremarkable, has not had any recurrence of her symptoms.  She also has depression, has been on Prozac 40 mg for more than 20 years, has been tolerated well, denies any suicidal thought.  PHQ 9 score was 0.  She has allergic rhinitis thought to be from her cat, she does use over-the-counter Zyrtec and Flonase with some improvement, she is interested in trying Xyzal after discussion with her.      Review of Systems As per HPI, otherwise negative.    OBJECTIVE   /60 (Patient Site: Right Arm)  Pulse 68  Temp 98.2  F (36.8  C) (Oral)    "Resp 13  Ht 5' 5.8\" (1.671 m)  Wt 128 lb (58.1 kg)  BMI 20.79 kg/m2  Physical Exam   Constitutional: She is oriented to person, place, and time. She appears well-developed and well-nourished.   HENT:   Head: Normocephalic and atraumatic.   Neck: Normal range of motion. Neck supple. No JVD present. No tracheal deviation present. No thyromegaly present.   Cardiovascular: Normal rate, regular rhythm, normal heart sounds and intact distal pulses.  Exam reveals no gallop and no friction rub.    No murmur heard.  Pulmonary/Chest: Effort normal and breath sounds normal. No respiratory distress. She has no wheezes. She has no rales.   Musculoskeletal: She exhibits no edema or tenderness.   Lymphadenopathy:     She has no cervical adenopathy.   Neurological: She is alert and oriented to person, place, and time. Coordination normal.   Psychiatric: She has a normal mood and affect. Judgment and thought content normal.       [facility]     Family History   Problem Relation Age of Onset     Acute Myocardial Infarction Father      Breast cancer Maternal Aunt      Social History     Social History     Marital status:      Spouse name: N/A     Number of children: N/A     Years of education: N/A     Occupational History     Not on file.     Social History Main Topics     Smoking status: Never Smoker     Smokeless tobacco: Not on file     Alcohol use Not on file     Drug use: Not on file     Sexual activity: Not on file     Other Topics Concern     Not on file     Social History Narrative     Relevant history was reviewed with the patient today, unless noted in HPI, nothing is pertinent for this visit.  [facility]     Patient Active Problem List    Diagnosis Date Noted     Controlled substance agreement signed 12/08/2015     Overview Note:     12/8/15       Allergic rhinitis 05/14/2015     Herpes Simplex Type II      Overview Note:     Created by Conversion    Replacement Utility updated for latest IMO load       Cervical Dysplasia     "  Overview Note:     Created by Conversion  [facility] Annotation: 2009  3:36PM - Liberty Modi: HPV    Replacement Utility updated for latest IMO load       Mild Recurrent Major Depression      Overview Note:     Stable on fluoxetine for 15 years         ADHD, Predominantly Inattentive Type      Overview Note:     Stable on Vyvanse for 5 years.  6 month checks    Replacement Utility updated for latest IMO load       Vitamin D Deficiency      Overview Note:     Created by Conversion    Replacement Utility updated for latest IMO load       Past Surgical History:   Procedure Laterality Date     [place]  DELIVERY ONLY  ,     Description:  Section;  Recorded: 2009;  Comments: x 2 (for breech, repeat)       RESULTS/CONSULTS (Lab/Rad)   No results found for this or any previous visit (from the past 168 hour(s)).  No results found.  MEDICATIONS     Current Outpatient Prescriptions on File Prior to Visit   Medication Sig Dispense Refill     cholecalciferol, vitamin D3, (VITAMIN D3) 2,000 unit cap Take 5,000 Units by mouth 2 (two) times a day. Take 2 capsule daily       FLUoxetine (PROZAC) 40 MG capsule Take 1 capsule (40 mg total) by mouth daily. 90 capsule 0     multivitamin (MULTIVITAMIN) per tablet Take 1 tablet by mouth 2 (two) times a day. Take 1  Tablet twice daily  Prothera Vitaprime plus 1 mg.  Biotin and 2000 IU vitamin D       [DISCONTINUED] fluticasone (FLONASE) 50 mcg/actuation nasal spray Use two sprays in each nostril once daily 16 g 5     [DISCONTINUED] lisdexamfetamine (VYVANSE) 20 MG capsule Take 1 capsule (20 mg total) by mouth every morning. 30 capsule 0     [DISCONTINUED] lisdexamfetamine (VYVANSE) 20 MG capsule Take 1 capsule (20 mg total) by mouth every morning. 30 capsule 0     [DISCONTINUED] lisdexamfetamine (VYVANSE) 20 MG capsule Take 1 capsule (20 mg total) by mouth every morning. 30 capsule 0     No current facility-administered medications on file prior to  visit.        HEALTH MAINTENANCE / SCREENING   PHQ-2 Total Score: 0 (12/28/2017  8:00 AM), PHQ-9 Total Score: 0 (12/28/2017  8:00 AM),KAYLAN-7 Total: 0 (12/28/2017  8:00 AM)  Immunization History   Administered Date(s) Administered     Influenza, inj, historic,unspecified 03/04/2008     Influenza,seasonal quad, PF, 36+MOS 12/08/2015, 12/28/2017     Influenza,seasonal, Inj IIV3 03/04/2008     Td, Adult, Absorbed 04/13/1995, 06/28/2004     Td,adult,historic,unspecified 06/28/2004     Tdap 04/07/2014     Health Maintenance   Topic     PAP SMEAR      INFLUENZA VACCINE RULE BASED (1)     MAMMOGRAM      DEPRESSION FOLLOW UP      ADVANCE DIRECTIVES DISCUSSED WITH PATIENT      TDAP ADULT ONE TIME DOSE      COLONOSCOPY      TD 18+ HE        Nicolas Seals MD  Family Medicine, [facility]     This note was dictated using a voice recognition software.  Any grammatical or context distortion are unintentional and inherent to the software.

## 2021-06-16 PROBLEM — E05.90 HYPERTHYROIDISM: Status: ACTIVE | Noted: 2021-04-27

## 2021-06-16 PROBLEM — F32.1 CURRENT MODERATE EPISODE OF MAJOR DEPRESSIVE DISORDER, UNSPECIFIED WHETHER RECURRENT (H): Status: ACTIVE | Noted: 2021-01-19

## 2021-06-16 NOTE — TELEPHONE ENCOUNTER
RN cannot approve Refill Request    RN can NOT refill this medication med is not covered by policy/route to provider. Last office visit: 8/7/2019 Lalitha Rivera MD Last Physical: Visit date not found Last MTM visit: Visit date not found Last visit same specialty: 8/7/2019 Lalitha Rivera MD.  Next visit within 3 mo: Visit date not found  Next physical within 3 mo: Visit date not found      Lucy White, [facility] Triage/Med Refill 4/6/2021    Requested Prescriptions   Pending Prescriptions Disp Refills     lisdexamfetamine (VYVANSE) 20 MG capsule 30 capsule 0     Sig: Take 1 capsule (20 mg total) by mouth every morning.       There is no refill protocol information for this order

## 2021-06-17 NOTE — PROGRESS NOTES
OFFICE VISIT NOTE      Assessment & Plan   Kasia Quiroz is a 56 y.o. female here with several issues for a 20min appointment    Needs pap done - which we did  Lichen sclerosus has not progressed    Menopause - she is using topical estrogen; I said it is ok x 2 yrs    Depression - she tried going off meds and felt it was a failure. She believes she needs both fluoxetine and vyvanse. I agree to continue them as long as she obeys the rules of the controlled substance agreement.     Diagnoses and all orders for this visit:    Encounter for routine gynecological examination  -     Gynecologic Cytology (PAP Smear)  -     Wet Prep, Vaginal    Current moderate episode of major depressive disorder, unspecified whether recurrent (H)    Controlled substance agreement signed    Attention deficit hyperactivity disorder (ADHD), predominantly inattentive type    Cervical Dysplasia    Herpes simplex infection of genitourinary system    Lichen sclerosus        Lalitha Rivera MD    60 minutes spent on the date of the encounter doing chart review, history and exam, documentation and further activities per the note        Subjective:   Chief Complaint:  Gynecologic Exam (questions about menopause, estrogen lotions. )    56 y.o. female.     1) working from home - hard  Languishing (article from the [place] [employer]) describes what she feels  Text groups, zoom calls help some  A break in the weather helps  Tried to go off the med (Vyvanse) - did not work; all I wanted to do was sleep, I had zero motivation, I got nothing done - everything piled up;weepy mood, cried too easily, thought all the worst for her son    2) topical estrogen used from her sister's recommendation. Is that OK? Safe? She applies it to her chest and legs. She buys it OTC.    3) UTI - self treated recently  Because she caught it early    4) she uses clobetasol on her rectum prn and on her vagina if she has itching     Current Outpatient Medications   Medication Sig      "FLUoxetine (PROZAC) 40 MG capsule Take one capsule by mouth every day.     lisdexamfetamine (VYVANSE) 20 MG capsule Take 1 capsule (20 mg total) by mouth every morning.     cholecalciferol, vitamin D3, (VITAMIN D3) 2,000 unit cap Take 5,000 Units by mouth 2 (two) times a day. Take 2 capsule daily     clobetasol (TEMOVATE) 0.05 % ointment Apply to perineum twice daily as needed     fluticasone propionate (FLONASE) 50 mcg/actuation nasal spray Use two sprays in each nostril once daily     multivitamin (MULTIVITAMIN) per tablet Take 1 tablet by mouth 2 (two) times a day. Take 1  Tablet twice daily  Prothera Vitaprime plus 1 mg.  Biotin and 2000 IU vitamin D       PSFHx: appropriate sections of PMH completed/filled in   Tobacco Status:  She  reports that she is a non-smoker but has been exposed to tobacco smoke. She has never used smokeless tobacco.    Review of Systems:  No fever.  No dysuria. All other systems negative except as noted above.    Objective:    /60   Pulse 79   Temp 98  F (36.7  C) (Oral)   Resp 18   Ht 5' 5\" (1.651 m)   Wt 129 lb 8 oz (58.7 kg)   LMP 03/11/2019 (Exact Date)   SpO2 99%   BMI 21.55 kg/m    GENERAL: No acute distress.  : labia majora without lesions; labia minora pink with little fusion or atrophy; Sharona speculum used without difficulty, vagina was pale pink, cervix smooth and pale pink; pap taken, wet prep done    Labs pending  "

## 2021-06-17 NOTE — TELEPHONE ENCOUNTER
Controlled Substance Refill Request  Medication Name:   Requested Prescriptions     Pending Prescriptions Disp Refills     lisdexamfetamine (VYVANSE) 20 MG capsule 30 capsule 0     Sig: Take 1 capsule (20 mg total) by mouth every morning.     Date Last Fill: 4/6/21  Requested Pharmacy: [facility]  Submit electronically to pharmacy  Controlled Substance Agreement on file:   Encounter-Level CSA Scan Date - 05/10/2018:    Scan on 5/10/2018:  [facility]           Encounter-Level CSA Scan Date - 02/13/2017:    Scan on 2/14/2017 11:22 AM           Encounter-Level CSA Scan Date - 12/08/2015:    Scan on 12/8/2015:  [facility]        Last office visit:  4/27/21

## 2021-06-17 NOTE — PROGRESS NOTES
OFFICE VISIT - FAMILY MEDICINE     ASSESSMENT AND PLAN     1. ADHD, Predominantly Inattentive Type  lisdexamfetamine (VYVANSE) 20 MG capsule    lisdexamfetamine (VYVANSE) 20 MG capsule    lisdexamfetamine (VYVANSE) 20 MG capsule   2. URI (upper respiratory infection)     ADHD appears stable with Vyvanse 20 mg daily, possible side effect discussed, we have discussed about possibility of tapering down if symptoms continue to improve.  Depression has been stable on Prozac, no side effect.  She did renew her controlled substance agreement today.  URI symptoms improved with symptomatic,  Healthcare maintenance discussed she will make a separate appointment.  CHIEF COMPLAINT   Cough ([facility] ) and Medication Refill    HPI   Kasia Quiroz is a 53 y.o. female.  Updated [facility] - Needs PAP  previously followed with Dr. Modi,  [facility] significant for recurrent major depression >15 years, ADHD predominantly inattentive diagnosed by an outside psychiatrist approximately five years ago.  She has been on Vyvanse since that time.D, allergic rhinitis.  She works as an  and has two teenaged children.   Patient is here for med check and refill, she takes Vyvanse for ADHD predominantly inattentive, is trying to go out of the medication in the past, but she get more depressed, medication does help her focus, especially at work, has not had any significant side effect decreased appetite or insomnia.  She also has mild chronic depression, has been stable with Prozac 40 mg daily.  She had URI symptoms a few weeks ago, mainly cough, dry nose runny nose etc. and just for the past few days symptom overall seems to be improving.      Review of Systems As per HPI, otherwise negative.    OBJECTIVE   BP 98/56 (Patient Site: Left Arm, Patient Position: Sitting, Cuff Size: Adult Regular)  Pulse 79  Temp 98.6  F (37  C) (Oral)   Wt 127 lb (57.6 kg)  SpO2 100%  BMI 20.62 kg/m2  Physical Exam    Constitutional: She is oriented to person, place, and time. She appears well-developed and well-nourished.   HENT:   Head: Normocephalic and atraumatic.   Neck: Normal range of motion. Neck supple. No JVD present. No tracheal deviation present. No thyromegaly present.   Cardiovascular: Normal rate, regular rhythm, normal heart sounds and intact distal pulses.  Exam reveals no gallop and no friction rub.    No murmur heard.  Pulmonary/Chest: Effort normal and breath sounds normal. No respiratory distress. She has no wheezes. She has no rales.   Musculoskeletal: She exhibits no edema or tenderness.   Lymphadenopathy:     She has no cervical adenopathy.   Neurological: She is alert and oriented to person, place, and time. Coordination normal.   Psychiatric: She has a normal mood and affect. Judgment and thought content normal.       [facility]     Family History   Problem Relation Age of Onset     Acute Myocardial Infarction Father      Breast cancer Maternal Aunt      Social History     Social History     Marital status:      Spouse name: N/A     Number of children: N/A     Years of education: N/A     Occupational History     Not on file.     Social History Main Topics     Smoking status: Never Smoker     Smokeless tobacco: Never Used     Alcohol use Not on file     Drug use: Not on file     Sexual activity: Not on file     Other Topics Concern     Not on file     Social History Narrative     Relevant history was reviewed with the patient today, unless noted in HPI, nothing is pertinent for this visit.  [facility]     Patient Active Problem List    Diagnosis Date Noted     Controlled substance agreement signed 12/08/2015     Overview Note:     2/13/17       Allergic rhinitis 05/14/2015     Herpes Simplex Type II      Overview Note:     Created by Conversion    Replacement Utility updated for latest IMO load       Cervical Dysplasia      Overview Note:     Created by [employer] [facility] Annotation: Jun 11 2009  3:36PM  - Liberty Modi: HPV    Replacement Utility updated for latest IMO load       Mild Recurrent Major Depression      Overview Note:     Stable on fluoxetine for 15 years         ADHD, Predominantly Inattentive Type      Overview Note:     Stable on Vyvanse for 5 years.  6 month checks    Replacement Utility updated for latest IMO load       Vitamin D Deficiency      Overview Note:     Created by Conversion    Replacement Utility updated for latest IMO load       Past Surgical History:   Procedure Laterality Date     NE  DELIVERY ONLY  ,     Description:  Section;  Recorded: 2009;  Comments: x 2 (for breech, repeat)       RESULTS/CONSULTS (Lab/Rad)   No results found for this or any previous visit (from the past 168 hour(s)).  Mammo Screening Bilateral    Result Date: 4/3/2018  BILATERAL FULL FIELD DIGITAL SCREENING MAMMOGRAM Performed on: 4/3/18 Compared to: 2017 Mammo Screening Bilateral Findings: The breasts are heterogeneously dense, which may obscure small masses. There is no radiographic evidence of malignancy. This study was evaluated with the assistance of Computer-Aided Detection. Continue routine screening mammogram as recommended. ACR BI-RADS Category 1: Negative     MEDICATIONS     Current Outpatient Prescriptions on File Prior to Visit   Medication Sig Dispense Refill     cholecalciferol, vitamin D3, (VITAMIN D3) 2,000 unit cap Take 5,000 Units by mouth 2 (two) times a day. Take 2 capsule daily       FLUoxetine (PROZAC) 40 MG capsule Take 1 capsule (40 mg total) by mouth daily. 90 capsule 0     fluticasone (FLONASE) 50 mcg/actuation nasal spray Use two sprays in each nostril once daily 16 g 5     multivitamin (MULTIVITAMIN) per tablet Take 1 tablet by mouth 2 (two) times a day. Take 1  Tablet twice daily  Prothera Vitaprime plus 1 mg.  Biotin and 2000 IU vitamin D       No current facility-administered medications on file prior to visit.        HEALTH MAINTENANCE /  SCREENING   PHQ-2 Total Score: 0 (12/28/2017  8:00 AM), PHQ-9 Total Score: 0 (12/28/2017  8:00 AM),KAYLAN-7 Total: 0 (12/28/2017  8:00 AM)  Immunization History   Administered Date(s) Administered     Influenza, inj, historic,unspecified 03/04/2008     Influenza,seasonal quad, PF, 36+MOS 12/08/2015, 12/28/2017     Influenza,seasonal, Inj IIV3 03/04/2008     Td, Adult, Absorbed 04/13/1995, 06/28/2004     Td,adult,historic,unspecified 06/28/2004     Tdap 04/07/2014     Health Maintenance   Topic     PAP SMEAR      DEPRESSION FOLLOW UP      MAMMOGRAM      ADVANCE DIRECTIVES DISCUSSED WITH PATIENT      TDAP ADULT ONE TIME DOSE      COLONOSCOPY      INFLUENZA VACCINE RULE BASED      TD 18+ HE      The following are part of a depression follow up plan for the patient:  coping support assessment, coping support management, emotional support assessment, emotional support education and emotional support management  Nicolas Seals MD  Family Medicine, [facility], [place], operated by [facility]     This note was dictated using a voice recognition software.  Any grammatical or context distortion are unintentional and inherent to the software.

## 2021-06-17 NOTE — TELEPHONE ENCOUNTER
Central PA team  [phone]  Pool: HE PA MED ([id number])          PA has been initiated.       PA form completed and faxed insurance via Cover My Meds     Choi:  OLEG RIVAS (Choi: [id number])    Medication: VYVANSE 20 MG    Insurance:  [employer]        Response will be received via fax and may take up to 5-10 business days depending on plan        
Telephone Encounter by Boom Santacruz at 1/26/2021 12:18 PM     Author: Boom Santacruz Service: -- Author Type: Patient Access    Filed: 1/26/2021 12:25 PM Encounter Date: 1/25/2021 Status: Signed    : Boom Santacruz (Patient Access)       PA APPROVED:    Approval start date: 12/26/20  Approval end date:  01/25/22    Pharmacy has been notified of approval and will contact patient when medication is ready for pickup.                  
10

## 2021-06-18 NOTE — PROGRESS NOTES
OFFICE VISIT NOTE      Assessment & Plan   Kasia Quiroz is a 53 y.o. female with likely new lichen sclerosus or other new problem.  It is possibly a contact dermatitis, too.  Either way, topical corticosteroids should help.    Diagnoses and all orders for this visit:    Vaginal itching  -     Wet Prep, Vaginal    Rash    Other orders  -     clobetasol (TEMOVATE) 0.05 % ointment; Apply to perineum twice daily as needed  Dispense: 60 g; Refill: 4      Lalitha Rivera MD              Subjective:   Chief Complaint:  Vaginal Itching (and the entire perineal area  just finised antibiotic)    53 y.o. female.     1) was just on an antibiotic for an ear infection  Got diarrhea  Now she itches badly on the perineum - terrible at night; she's tried tons of things, nothing helps  Urination is OK  No vaginal discharge; she's had yeast infections in the past - this is not like that.      Current Outpatient Prescriptions   Medication Sig Note     cholecalciferol, vitamin D3, (VITAMIN D3) 2,000 unit cap Take 5,000 Units by mouth 2 (two) times a day. Take 2 capsule daily      FLUoxetine (PROZAC) 40 MG capsule Take 1 capsule (40 mg total) by mouth daily.      fluticasone (FLONASE) 50 mcg/actuation nasal spray Use two sprays in each nostril once daily      lisdexamfetamine (VYVANSE) 20 MG capsule Take 1 capsule (20 mg total) by mouth every morning.      multivitamin (MULTIVITAMIN) per tablet Take 1 tablet by mouth 2 (two) times a day. Take 1  Tablet twice daily  Prothera Vitaprime plus 1 mg.  Biotin and 2000 IU vitamin D      amoxicillin-clavulanate (AUGMENTIN) 875-125 mg per tablet TK 1 T PO BID FOR 10 DAYS 5/25/2018: completed     clobetasol (TEMOVATE) 0.05 % ointment Apply to perineum twice daily as needed      [START ON 7/10/2018] lisdexamfetamine (VYVANSE) 20 MG capsule Take 1 capsule (20 mg total) by mouth every morning.      [START ON 6/10/2018] lisdexamfetamine (VYVANSE) 20 MG capsule Take 1 capsule (20 mg total) by mouth  "every morning.        PSFHx: appropriate sections of PMH completed/filled in   Tobacco Status:  She  reports that she has never smoked. She has never used smokeless tobacco.    Review of Systems:  No fever.  No vaginal bleeding.    Objective:    /70  Pulse 88  Temp 97.8  F (36.6  C) (Oral)   Resp 14  Ht 5' 5.79\" (1.671 m)  Wt 129 lb 1.3 oz (58.6 kg)  LMP 05/15/2018 (Approximate)  SpO2 98%  BMI 20.97 kg/m2  GENERAL: No acute distress.  : perineum is brightly erythematous without induration or edema, no excoriation; mucosa appears a bit red but not inflamed; no loss of labia minora;   Rectal: 2 hemorrhoids, each with erythema on the exposed area    Wet prep: neg for yeast      "

## 2021-06-18 NOTE — LETTER
Letter by Delia Plummer LPN at      Author: Delia Plummer LPN Service: -- Author Type: --    Filed:  Encounter Date: 2/12/2019 Status: (Other)         [facility] MEDICINE/OB   02/12/19    Patient: Kasia Quiroz  YOB: 1965  Medical Record Number: [id number]  CSN: [id number]                                                                              Non-opioid Controlled Substance Agreement    I understand that my care provider has prescribed a controlled substance to help manage my condition(s). I am taking this medicine to help me function or work. I know this is strong medicine, and that it can cause serious side effects. Controlled substances can be sedating, addicting and may cause a dependency on the drug. They can affect my ability to drive or think, and cause depression. They need to be taken exactly as prescribed. Combining controlled substances with certain medicines or chemicals (such as cocaine, sedatives and tranquilizers, sleeping pills, meth) can be dangerous or even fatal. Also, if I stop controlled substances suddenly, I may have severe withdrawal symptoms.  If not helpful, I may be asked to stop them.    The risks, benefits, and side effects of these medicine(s) were explained to me. I agree that:    1. I will take part in other treatments as advised by my care team. This may be psychiatry or counseling, physical therapy, behavioral therapy, group treatment or a referral to a pain clinic. I will reduce or stop my medicine when my care team tells me to do so.  2. I will take my medicines as prescribed. I will not change the dose or schedule unless my care team tells me to. There will be no refills if I run out early.  I may be contactedwithout warning and asked to complete a urine drug test or pill count at any time.   3. I will keep all my appointments, and understand this is part of the monitoring of controlled substances. My care team may require an office visit for EVERY  controlled substance refill. If I miss appointments or dont follow instructions, my care team may stop my medicine.  4. I will not ask other providers to prescribe controlled substances, and I will not accept controlled substances from other people. If I need another prescribed controlled substance for a new reason, I will tell my care team within 1 business day.  5. I will use one pharmacy to fill all of my controlled substance prescriptions, and it is up to me to make sure that I do not run out of my medicines on weekends or holidays. If my care team is willing to refill my controlled substance prescription without a visit, I must request refills only during office hours, refills may take up to 3 days to process, and it may take up to 5 to 7 days for my medicine to be mailed and ready at my pharmacy. Prescriptions will not be mailed anywhere except my pharmacy.    6. I am responsible for my prescriptions. If the medicine/prescription is lost or stolen, it will not be replaced. I also agree not to share controlled substance medicines with anyone.          [facility] MEDICINE/OB   02/12/19  Patient:  Kasia Quiroz  YOB: 1965  Medical Record Number: [id number]  CSN: [id number]    7. I agree to not use ANY illegal or recreational drugs. This includes marijuana, cocaine, bath salts or other drugs. I agree not to use alcohol unless my care team says I may. I agree to give urine samples whenever asked. If I dont give a urine sample, the care team may stop my medicine.    8. If I enroll in the [place] Medical Marijuana program, I will tell my care team. I will also sign an agreement to share my medical records with my care team.    9. I will bring in my list of medicines (or my medicine bottles) each time I come to the clinic.   10. I will tell my care team right away if I become pregnant or have a new medical problem treated outside of my regular clinic.  11. I understand that this medicine can affect my  thinking and judgment. It may be unsafe for me to drive, use machinery and do dangerous tasks. I will not do any of these things until I know how the medicine affects me. If my dose changes, I will wait to see how it affects me. I will contact my care team if I have concerns about medicine side effects.    I understand that if I do not follow any of the conditions above, my prescriptions or treatment may be stopped.      I agree that my provider, clinic care team, and pharmacy may work with any city, state or federal law enforcement agency that investigates the misuse, sale, or other diversion of my controlled medicine. I will allow my provider to discuss my care with or share a copy of this agreement with any other treating provider, pharmacy or emergency room where I receive care. I agree to give up (waive) any right of privacy or confidentiality with respect to these consents.   I have read this agreement and have asked questions about anything I did not understand.    ___________________________________________________________________________  Patient signature - Date/Time  -Kasia Quiroz                                      ___________________________________________________________________________  Witness signature                                                                    ___________________________________________________________________________  Provider signature- Delia Plummer LPN

## 2021-06-19 NOTE — LETTER
"Letter by Lalitha Rivera MD at      Author: Lalitha Rivera MD Service: -- Author Type: --    Filed:  Encounter Date: 9/22/2019 Status: Signed         Kasia Quiroz  [facility] [address]  [facility] [id number]               September 22, 2019      Dear Kasia:    The results of your most recent Pap smear are normal. This means that no cancerous or precancerous cells were seen, but the \"other\" HPV type was seen again. What is great its that the cells this time were normal (not \"close to normal\" or ASCUS). We recommend that you come back in 1 year for your next routine Pap smear.    Please call with questions or contact us using [facility].    Sincerely,        Lalitha Rivera MD       "

## 2021-06-20 NOTE — PROGRESS NOTES
OFFICE VISIT - FAMILY MEDICINE     ASSESSMENT AND PLAN     1. ADHD, Predominantly Inattentive Type  lisdexamfetamine (VYVANSE) 20 MG capsule    lisdexamfetamine (VYVANSE) 20 MG capsule    lisdexamfetamine (VYVANSE) 20 MG capsule   2. Mild Recurrent Major Depression     3. Controlled substance agreement signed  Pain Clinic Survey, Urine    Pain Clinic Survey, Urine   ADHD predominantly inattentive, has been well controlled with Vyvanse 20 mg daily, we discussed about possibly weaning her off the medication as her symptoms continue to be stable, she will let us, we also discussed about possible long-term use side effect included hypertension etc.  Mild depression, has been stable on fluoxetine 40 mg daily.  She will make a separate appointment for her women physical and Pap smear at her earliest convenience with her provider of choice.    CHIEF COMPLAINT   No chief complaint on file.    HPI   Kasia Quiroz is a 53 y.o. female.  Updated MIIC - Needs PAP  previously followed with Dr. Modi,  [facility] significant for recurrent major depression >15 years, ADHD predominantly inattentive diagnosed by an outside psychiatrist approximately five years ago.  She has been on Vyvanse since that time.D, allergic rhinitis.  She works as an  and has two teenaged children.     ADHD predominantly inattentive, has been controlled with Vyvanse 20 mg daily, she usually takes it during the weekday to help her focus and  perform well at work, and she take a medication holiday during the weekend.  Has been on this medication for almost 10 years now, has been tolerated well with no major side effect.  Several years ago she did attempt to stop the medication without weaning off and she had some major side effects included worsening of her ADHD symptoms.  So has mild depression, has been very stable she is on fluoxetine 40 mg daily.      Review of Systems As per HPI, otherwise negative.    OBJECTIVE   /60  Pulse 94  Wt 124 lb  (56.2 kg)  SpO2 99%  BMI 20.14 kg/m2  Physical Exam   Constitutional: She is oriented to person, place, and time. She appears well-developed and well-nourished.   HENT:   Head: Normocephalic and atraumatic.   Neck: Normal range of motion. Neck supple. No JVD present. No tracheal deviation present. No thyromegaly present.   Cardiovascular: Normal rate, regular rhythm, normal heart sounds and intact distal pulses.  Exam reveals no gallop and no friction rub.    No murmur heard.  Pulmonary/Chest: Effort normal and breath sounds normal. No respiratory distress. She has no wheezes. She has no rales.   Musculoskeletal: She exhibits no edema or tenderness.   Lymphadenopathy:     She has no cervical adenopathy.   Neurological: She is alert and oriented to person, place, and time. Coordination normal.   Psychiatric: She has a normal mood and affect. Judgment and thought content normal.       [facility]     Family History   Problem Relation Age of Onset     Acute Myocardial Infarction Father      Breast cancer Maternal Aunt      Social History     Social History     Marital status:      Spouse name: N/A     Number of children: N/A     Years of education: N/A     Occupational History     Not on file.     Social History Main Topics     Smoking status: Never Smoker     Smokeless tobacco: Never Used     Alcohol use Not on file     Drug use: Not on file     Sexual activity: Not on file     Other Topics Concern     Not on file     Social History Narrative     Relevant history was reviewed with the patient today, unless noted in HPI, nothing is pertinent for this visit.  [facility]     Patient Active Problem List    Diagnosis Date Noted     Controlled substance agreement signed 12/08/2015     Overview Note:     2/13/17       Allergic rhinitis 05/14/2015     Herpes Simplex Type II      Overview Note:     Created by Conversion    Replacement Utility updated for latest IMO load       Cervical Dysplasia      Overview Note:     Created by  [facility] Annotation: 2009  3:36PM - Liberty Modi: HPV    Replacement Utility updated for latest IMO load       Mild Recurrent Major Depression      Overview Note:     Stable on fluoxetine for 15 years         ADHD, Predominantly Inattentive Type      Overview Note:     Stable on Vyvanse for 5 years.  6 month checks    Replacement Utility updated for latest IMO load       Vitamin D Deficiency      Overview Note:     Created by Conversion    Replacement Utility updated for latest IMO load       Past Surgical History:   Procedure Laterality Date     CT  DELIVERY ONLY  ,     Description:  Section;  Recorded: 2009;  Comments: x 2 (for breech, repeat)       RESULTS/CONSULTS (Lab/Rad)   No results found for this or any previous visit (from the past 168 hour(s)).  No results found.  MEDICATIONS     Current Outpatient Prescriptions on File Prior to Visit   Medication Sig Dispense Refill     cholecalciferol, vitamin D3, (VITAMIN D3) 2,000 unit cap Take 5,000 Units by mouth 2 (two) times a day. Take 2 capsule daily       clobetasol (TEMOVATE) 0.05 % ointment Apply to perineum twice daily as needed 60 g 4     FLUoxetine (PROZAC) 40 MG capsule TAKE 1 CAPSULE(40 MG) BY MOUTH DAILY 90 capsule 2     multivitamin (MULTIVITAMIN) per tablet Take 1 tablet by mouth 2 (two) times a day. Take 1  Tablet twice daily  Prothera Vitaprime plus 1 mg.  Biotin and 2000 IU vitamin D       [DISCONTINUED] fluticasone (FLONASE) 50 mcg/actuation nasal spray Use two sprays in each nostril once daily 16 g 5     [DISCONTINUED] amoxicillin-clavulanate (AUGMENTIN) 875-125 mg per tablet TK 1 T PO BID FOR 10 DAYS  0     [DISCONTINUED] lisdexamfetamine (VYVANSE) 20 MG capsule Take 1 capsule (20 mg total) by mouth every morning. 30 capsule 0     [DISCONTINUED] lisdexamfetamine (VYVANSE) 20 MG capsule Take 1 capsule (20 mg total) by mouth every morning. 30 capsule 0     [DISCONTINUED] lisdexamfetamine  (VYVANSE) 20 MG capsule Take 1 capsule (20 mg total) by mouth every morning. 30 capsule 0     No current facility-administered medications on file prior to visit.        HEALTH MAINTENANCE / SCREENING   PHQ-2 Total Score: 0 (12/28/2017  8:00 AM), PHQ-9 Total Score: 0 (12/28/2017  8:00 AM),KAYLAN-7 Total: 0 (12/28/2017  8:00 AM)  Immunization History   Administered Date(s) Administered     Influenza, inj, historic,unspecified 03/04/2008     Influenza,seasonal quad, PF, 36+MOS 12/08/2015, 12/28/2017, 10/02/2018     Influenza,seasonal, Inj IIV3 03/04/2008     Td, Adult, Absorbed 04/13/1995, 06/28/2004     Td,adult,historic,unspecified 06/28/2004     Tdap 04/07/2014     Health Maintenance   Topic     PAP SMEAR      DEPRESSION FOLLOW UP      INFLUENZA VACCINE RULE BASED (1)     MAMMOGRAM      ADVANCE DIRECTIVES DISCUSSED WITH PATIENT      TDAP ADULT ONE TIME DOSE      COLONOSCOPY      TD 18+ HE        Nicolas Seals MD  Family Medicine, [facility]     This note was dictated using a voice recognition software.  Any grammatical or context distortion are unintentional and inherent to the software.

## 2021-06-23 NOTE — TELEPHONE ENCOUNTER
Medication Question or Clarification  Who is calling: Patient  What medication are you calling about?:   lisdexamfetamine (VYVANSE) 20 MG capsule 30 capsule 0 12/2/2018 1/1/2019     Who prescribed the medication?: Dr Rivera  What is your question/concern?: Patient would like a short fill up to her appointment she has made to see the provider.  [facility] [place]  Okay to leave a detailed message?: Yes  Site CMT - Please call the pharmacy to obtain any additional needed information.

## 2021-06-23 NOTE — PROGRESS NOTES
OFFICE VISIT NOTE      Assessment & Plan   Kasia Quiroz is a 53 y.o. female who found a left breast lump 2 days ago. I confirm it and order a mammogram and u/s to evaluate it more. She is a little nervous, understandably.    Kasia needs a PCP and might consider establishing with me.    Diagnoses and all orders for this visit:    Left breast lump  -     Cancel: Mammo Diagnostic Left  -     US Breast Limited (Focal) Left  -     Mammo Diagnostic Bilateral        Lalitha Rivera MD              Subjective:   Chief Complaint:  Breast Mass (left lower breast)    53 y.o. female.     1) left lower breast bump  No drainage  Non-tender    H/o smoking- a little x 2 years in college; had passive exposure growing up  Family history - breast cancer but only in women over 80    Current Outpatient Medications   Medication Sig     cholecalciferol, vitamin D3, (VITAMIN D3) 2,000 unit cap Take 5,000 Units by mouth 2 (two) times a day. Take 2 capsule daily     clobetasol (TEMOVATE) 0.05 % ointment Apply to perineum twice daily as needed     FLUoxetine (PROZAC) 40 MG capsule TAKE 1 CAPSULE(40 MG) BY MOUTH DAILY     fluticasone (FLONASE) 50 mcg/actuation nasal spray Use two sprays in each nostril once daily     lisdexamfetamine (VYVANSE) 20 MG capsule Take 1 capsule (20 mg total) by mouth every morning.     lisdexamfetamine (VYVANSE) 20 MG capsule Take 1 capsule (20 mg total) by mouth every morning.     lisdexamfetamine (VYVANSE) 20 MG capsule Take 1 capsule (20 mg total) by mouth every morning.     multivitamin (MULTIVITAMIN) per tablet Take 1 tablet by mouth 2 (two) times a day. Take 1  Tablet twice daily  Prothera Vitaprime plus 1 mg.  Biotin and 2000 IU vitamin D       PSFHx: appropriate sections of PMH completed/filled in   Tobacco Status:  She  reports that  has never smoked. she has never used smokeless tobacco.    Review of Systems:  No fever.  No rash. All other systems negative except as noted above.    Objective:    /64  "  Pulse 75   Temp 97.7  F (36.5  C) (Oral)   Resp 12   Ht 5' 5.79\" (1.671 m)   Wt 120 lb 1.3 oz (54.5 kg)   LMP  (LMP Unknown)   SpO2 99%   BMI 19.51 kg/m    GENERAL: No acute distress.  Breasts - hang without dimpling, as she lifts her left arm, I see the fullness of the left lower breast decrease; with palpation, by palpation, the mass is 3 x 2cm and just less than 1cm thick. It feels rough and solid but not hard, not smooth and round.    Spent 25 min face to face with patient with more the 50% spent in counseling, reviewing chart, and coordination of care and discussing breast mass, chance for cancer, need for testing.      "

## 2021-06-24 NOTE — TELEPHONE ENCOUNTER
Patient has an appointment with me TODAY to sign a med agreement. I'm waiting to refill the med after the appointment.

## 2021-06-24 NOTE — PROGRESS NOTES
OFFICE VISIT NOTE      Assessment & Plan   Kasia Quiroz is a 53 y.o. female here to sign a med agreement and do a urine drug screen so I can prescribe her Vyvanse for her. She notes she sometimes does not take it on weekends, so there are times her prescription lasts a little longer than 30 days. She still plans to do an establish care visit next month.  She is used to coming in every 3 months to get her prescriptions. I do not require that. At least annual visits and if we need to watch BP or other problems, then more often accordingly.      Diagnoses and all orders for this visit:    ADHD, Predominantly Inattentive Type  -     lisdexamfetamine (VYVANSE) 20 MG capsule  Dispense: 30 capsule; Refill: 0  -     Drug Abuse 1+, Urine        Lalitha Rivera MD              Subjective:   Chief Complaint:  Follow-up (medication)    53 y.o. female.     1) she takes Vyvanse for adult ADHD. She feels she works better on it. However, some weekends she does not take it.    2) denies other drug use.    Current Outpatient Medications   Medication Sig     cholecalciferol, vitamin D3, (VITAMIN D3) 2,000 unit cap Take 5,000 Units by mouth 2 (two) times a day. Take 2 capsule daily     clobetasol (TEMOVATE) 0.05 % ointment Apply to perineum twice daily as needed     FLUoxetine (PROZAC) 40 MG capsule TAKE 1 CAPSULE(40 MG) BY MOUTH DAILY     fluticasone (FLONASE) 50 mcg/actuation nasal spray Use two sprays in each nostril once daily     lisdexamfetamine (VYVANSE) 20 MG capsule Take 1 capsule (20 mg total) by mouth every morning.     lisdexamfetamine (VYVANSE) 20 MG capsule Take 1 capsule (20 mg total) by mouth every morning.     lisdexamfetamine (VYVANSE) 20 MG capsule Take 1 capsule (20 mg total) by mouth every morning.     multivitamin (MULTIVITAMIN) per tablet Take 1 tablet by mouth 2 (two) times a day. Take 1  Tablet twice daily  Prothera Vitaprime plus 1 mg.  Biotin and 2000 IU vitamin D       PSFHx: appropriate sections of PMH  "completed/filled in   Tobacco Status:  She  reports that  has never smoked. she has never used smokeless tobacco.    Review of Systems:  No fever.  No diarrhea. All other systems negative except as noted above.    Objective:    /56   Pulse 91   Temp 98.4  F (36.9  C) (Oral)   Resp 12   Ht 5' 5.79\" (1.671 m)   Wt 123 lb 0.6 oz (55.8 kg)   LMP  (LMP Unknown)   SpO2 98%   BMI 19.99 kg/m    GENERAL: No acute distress.  Mood: good  Insight: good  Judgment: good  Affect: normal    Signed non-opioid drug agreement after reviewing it    Spent 20 min face to face with patient with more the 50% spent in counseling, reviewing chart, and coordination of care and discussing vyvanse and ADHD.        "

## 2021-06-25 NOTE — PROGRESS NOTES
"OFFICE VISIT NOTE      Assessment & Plan   Kasia Quiroz is a 54 y.o. female with many stressors in her life. She wants to establish care. We reviewed her complete medical history and current life situation.  We agreed that she will remain on the same medications at this time and for the foreseeable future.    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  She will continue on Vyvanse 20mg (sometimes not taken on weekends) and fluoxetine 40mg. By May, she'll come in to sign a new med agreement. She has already done the urine drug screen and passed.    She will use flonase prn seasonal allergies.    Diagnoses and all orders for this visit:    Routine general medical examination at a health care facility  -     Gynecologic Cytology (PAP Smear)    Cervical Dysplasia  -     Gynecologic Cytology (PAP Smear)  -     Wet Prep, Vaginal    Mild Recurrent Major Depression  -     FLUoxetine (PROZAC) 40 MG capsule  Dispense: 90 capsule; Refill: 4    Allergic rhinitis due to animals  -     fluticasone (FLONASE) 50 mcg/actuation nasal spray  Dispense: 16 g; Refill: 5    Herpes simplex infection of genitourinary system    Attention deficit hyperactivity disorder (ADHD), predominantly inattentive type  -     FLUoxetine (PROZAC) 40 MG capsule  Dispense: 90 capsule; Refill: 4    Controlled substance agreement signed    Other orders  -     Cancel: Gynecologic Cytology (PAP Smear)        Lalitha Rivera MD    I gave her copies of an advanced directive and strongly urged her to complete it and bring in a copy.  I have had an Advance Directives discussion with the patient.  The following are part of a depression follow up plan for the patient:  coping support assessment and coping support management          Subjective:   Chief Complaint:  Establish Care    54 y.o. female.     1) due for a pap- h/o dysplasia in mid 2000s; had colopscopy and \"scraping\", then paps were " "normal. Last pap 2012.    2) just had a period - bled 7 days    3) gets lightheaded talking about periods    4) 2015 - started with hemorrhoids - managing ok but frustrated at the itching she has.  5) with the stressors she has - caring for her mom who is aging, and her mentally ill brother, there is stress in her life. She prefers strongly to remain on fluoxetine and Vyvanse, ongoing.    6) occasionally uses clobetasol prn on her genital area if she feels it is dry/sore like in 5/2018 when I thought she might have early lichen schlerosus.    Current Outpatient Medications   Medication Sig     cholecalciferol, vitamin D3, (VITAMIN D3) 2,000 unit cap Take 5,000 Units by mouth 2 (two) times a day. Take 2 capsule daily     clobetasol (TEMOVATE) 0.05 % ointment Apply to perineum twice daily as needed     FLUoxetine (PROZAC) 40 MG capsule TAKE 1 CAPSULE(40 MG) BY MOUTH DAILY     fluticasone (FLONASE) 50 mcg/actuation nasal spray Use two sprays in each nostril once daily     lisdexamfetamine (VYVANSE) 20 MG capsule Take 1 capsule (20 mg total) by mouth every morning.     multivitamin (MULTIVITAMIN) per tablet Take 1 tablet by mouth 2 (two) times a day. Take 1  Tablet twice daily  Prothera Vitaprime plus 1 mg.  Biotin and 2000 IU vitamin D       PSFHx: appropriate sections of PMH completed/filled in   Tobacco Status:  She  reports that she is a non-smoker but has been exposed to tobacco smoke. she has never used smokeless tobacco.    Review of Systems:  No fever.  No rash. All other systems negative except as noted above.    Objective:    /70   Pulse 69   Temp 98  F (36.7  C) (Oral)   Resp 16   Ht 5' 5.75\" (1.67 m)   Wt 125 lb 8 oz (56.9 kg)   LMP 03/11/2019 (Exact Date)   SpO2 99%   BMI 20.41 kg/m    General Appearance: Alert, cooperative, moderate distress when we talked about her periods, otherwise fine, appears stated age  Head: Normocephalic, without obvious abnormality, atraumatic  Eyes: PERRL, " conjunctiva/corneas clear, EOM's intact  Throat: Lips, mucosa, and tongue normal; teeth and gums normal  Neck: Supple, symmetrical, trachea midline, no adenopathy;  thyroid: not enlarged, symmetric, no tenderness/mass/nodules  Back: Symmetric, no curvature, ROM normal, no CVA tenderness  Abdomen: Soft, non-tender, bowel sounds active,  no masses, no organomegaly  Pelvic:Normally developed genitalia with no external lesions or eruptions. Vagina and cervix show no lesions, inflammation, discharge or tenderness. The color was pink. No cystocele, No rectocele. Uterus anteriorly bent, nongravid size.  No adnexal mass or tenderness.  Extremities: Extremities normal, atraumatic, no cyanosis or edema  Skin: Skin color, texture, turgor normal, no rashes or lesions  Neurologic: coordination was smooth    Wet prep neg  Pap pending    We reviewed past labs; lipids next due by 2022    I spent a total of 60min with her, 25min on general health maintenance and exam, other 35min on meds, stressors/mood/coping, completing and updating her medical chart.

## 2021-06-25 NOTE — TELEPHONE ENCOUNTER
Controlled Substance Refill Request  Medication Name:   Requested Prescriptions     Pending Prescriptions Disp Refills     lisdexamfetamine (VYVANSE) 20 MG capsule 30 capsule 0     Sig: Take 1 capsule (20 mg total) by mouth every morning.     Date Last Fill: 5/14/21  Requested Pharmacy: [facility]  Submit electronically to pharmacy  Controlled Substance Agreement on file:   Encounter-Level CSA Scan Date - 05/10/2018:    Scan on 5/10/2018:  [facility]           Encounter-Level CSA Scan Date - 02/13/2017:    Scan on 2/14/2017 11:22 AM           Encounter-Level CSA Scan Date - 12/08/2015:    Scan on 12/8/2015:  [facility]        Last office visit:  6/4/21

## 2021-07-04 NOTE — PROGRESS NOTES
Progress Notes by Lalitha Rivera MD at 6/4/2021  7:00 AM     Author: Lalitha Rivera MD Service: -- Author Type: Physician    Filed: 6/5/2021 12:08 PM Encounter Date: 6/4/2021 Status: Signed    : Lalitha Rivera MD (Physician)       FEMALE PREVENTATIVE EXAM    Assessment and Plan:   55yo female here for a physical. She's started exercising and has realized it's good to do it in the morning.  She wants to continue with Vyvanse (she tried going off it in the fall 2020 and it did not work)    1. Routine general medical examination at a health care facility  Generally well. Keep up exercise.    2. ASCUS with positive high risk HPV cervical  Repeat in 6-12 months. Last pap was normal but history shows abnormality    3. Current moderate episode of major depressive disorder, unspecified whether recurrent (H)  Better overall    4. Controlled substance agreement signed  Done today    5. Attention deficit hyperactivity disorder (ADHD), predominantly inattentive type  Treated with Vyvanse    6. Herpes simplex infection of genitourinary system  Not a current problem    7. Hyperthyroidism  Follow with labs    Next follow up:  Return in about 6 months (around 12/4/2021).    Immunization Review  Adult Imm Review: No immunizations due today  deferring any vaccines until fall to optimize Covid vaccine response    I discussed the following with the patient:   Adult Healthy Living: Importance of regular exercise  Healthy nutrition  Getting adequate sleep  Stress management  Use of seat belts    I have had an Advance Directives discussion with the patient.           Subjective:   Chief Complaint: Kasia Quiroz is an 56 y.o. female here for a preventative health visit.    Patient has been advised of split billing requirements and indicates understanding: Yes  HPI:  Just joined a kickboxing group    Spots on face        Healthy Habits  Are you taking a daily aspirin? No  Do you typically exercising at least 40  "min, 3-4 times per week?  Yes  Do you usually eat at least 4 servings of fruit and vegetables a day, include whole grains and fiber and avoid regularly eating high fat foods? Yes  Have you had an eye exam in the past two years? Yes  Do you see a dentist twice per year? Yes  Do you have any concerns regarding sleep? No    Safety Screen  If you own firearms, are they secured in a locked gun cabinet or with trigger locks? The patient does not own any firearms  Do you feel you are safe where you are living?: Yes (6/4/2021  7:07 AM)  Do you feel you are safe in your relationship(s)?: Yes (6/4/2021  7:07 AM)      Review of Systems:  Please see above.  The rest of the review of systems are negative for all systems.       Cancer Screening       Status Date      PAP SMEAR Next Due 8/27/2021      Done 4/27/2021 GYNECOLOGIC CYTOLOGY (PAP SMEAR)     Patient has more history with this topic...    MAMMOGRAM Next Due 6/4/2023      Done 6/4/2021 MAMMO SCREENING BILATERAL     Patient has more history with this topic...          History     Reviewed By Date/Time Sections Reviewed    Lalitha Rivera MD 6/4/2021  8:11 AM Medical, Surgical, Tobacco, Family    EsquivelZaina gould MA 6/4/2021  7:09 AM Tobacco            Objective:   Vital Signs:   Visit Vitals  /60   Pulse 86   Temp 98.1  F (36.7  C) (Oral)   Resp 16   Ht 5' 5.95\" (1.675 m)   Wt 130 lb 12 oz (59.3 kg)   LMP 03/11/2019 (Exact Date)   SpO2 96%   BMI 21.14 kg/m           PHYSICAL EXAM  Physical Exam:  General Appearance: Alert, cooperative, no distress, appears stated age  Head: Normocephalic, without obvious abnormality, atraumatic  Eyes: PERRL, conjunctiva/corneas clear, EOM's intact  Ears: TM's clear and retracted,external ear canals with mild amount of cerumen, both ears  Nose: Nares clear, septum midline,mucosa pink and moist, no drainage  Throat: Lips, mucosa, and tongue moist without lesions; teeth clean  Neck: Supple, symmetrical, trachea midline, no " adenopathy;  thyroid: not enlarged, symmetric, no tenderness/mass/nodules  Back: Symmetric, no curvature, ROM normal, no CVA tenderness  Lungs: Clear to auscultation bilaterally, respirations unlabored  Breasts: No breast masses, tenderness, asymmetry, or nipple discharge (very tiny amount of clear fluid on left)  Heart: Regular rate and rhythm, S1 and S2 normal, no murmur  Abdomen: Soft, non-tender, bowel sounds active,  no masses, no organomegaly  Extremities: Extremities have symmetric bulk and tone, atraumatic, no cyanosis or edema  Skin: no rashes or lesions, warm  Neurologic: smooth coordination during exam, +2/4 DTRs in patellar tendons      The 10-year ASCVD risk score (Stantonanupam GARCIA Jr., et al., 2013) is: 0.9%    Values used to calculate the score:      Age: 56 years      Sex: Female      Is Non- : No      Diabetic: No      Tobacco smoker: No      Systolic Blood Pressure: 100 mmHg      Is BP treated: No      HDL Cholesterol: 93 mg/dL      Total Cholesterol: 227 mg/dL         Medication List          Accurate as of June 4, 2021 11:59 PM. If you have any questions, ask your nurse or doctor.            CONTINUE taking these medications    clobetasoL 0.05 % ointment  Also known as: TEMOVATE  INSTRUCTIONS: Apply to perineum twice daily as needed        FLUoxetine 40 MG capsule  Also known as: PROzac  INSTRUCTIONS: Take one capsule by mouth every day.        fluticasone propionate 50 mcg/actuation nasal spray  Also known as: Flonase  INSTRUCTIONS: Use two sprays in each nostril once daily        lisdexamfetamine 20 MG capsule  Also known as: Vyvanse  INSTRUCTIONS: Take 1 capsule (20 mg total) by mouth every morning.        multivitamin per tablet  INSTRUCTIONS: Take 1 tablet by mouth 2 (two) times a day. Take 1  Tablet twice daily  Prothera Vitaprime plus 1 mg.  Biotin and 2000 IU vitamin D  Generic drug: multivitamin        Vitamin D3 50 mcg (2,000 unit) capsule  INSTRUCTIONS: Take 5,000 Units  by mouth 2 (two) times a day. Take 2 capsule daily  Generic drug: cholecalciferol (vitamin D3)               Additional Screenings Completed Today:

## 2021-07-04 NOTE — LETTER
Letter by Lalitha Rivera MD at      Author: Lalitha Rivera MD Service: -- Author Type: --    Filed:  Encounter Date: 6/4/2021 Status: (Other)       Opioid / Opioid Plus Controlled Substance Agreement    This is an agreement between you and your provider about the safe and appropriate use of controlled substance/opioids prescribed by your care team. Controlled substances are medicines that can cause physical and mental dependence (abuse).    There are strict laws about having and using these medicines. We here at [facility] are committing to working with you in your efforts to get better. To support you in this work, well help you schedule regular office appointments for medicine refills. If we must cancel or change your appointment for any reason, well make sure you have enough medicine to last until your next appointment.     As a Provider, I will:    Listen carefully to your concerns and treat you with respect.     Recommend a treatment plan that I believe is in your best interest. This plan may involve therapies other than opioid pain medication.     Talk with you often about the possible benefits, and the risk of harm of any medicine that we prescribe for you.     Provide a plan on how to taper (discontinue or go off) using this medicine if the decision is made to stop its use.    As a Patient, I understand that opioid(s):     Are a controlled substance prescribed by my care team to help me function or work and manage my condition(s).     Are strong medicines and can cause serious side effects such as:    Drowsiness, which can seriously affect my driving ability    A lower breathing rate, enough to cause death    Harm to my thinking ability     Depression     Abuse of and addiction to this medicine    Need to be taken exactly as prescribed. Combining opioids with certain medicines or chemicals (such as illegal drugs, sedatives, sleeping pills, and benzodiazepines) can be dangerous or even  fatal. If I stop opioids suddenly, I may have severe withdrawal symptoms.    Do not work for all types of pain nor for all patients. If theyre not helpful, I may be asked to stop them.    {Benzo / Stimulant (Optional) :112057}    The risks, benefits and side effects of these medicine(s) were explained to me. I agree that:  1. I will take part in other treatments as advised by my care team. This may be psychiatry or counseling, physical therapy, behavioral therapy, group treatment or a referral to a specialist.     2. I will keep all my appointments. I understand that this is part of the monitoring of opioids. My care team may require an office visit for EVERY opioid/controlled substance refill. If I miss appointments or dont follow instructions, my care team may stop my medicine.    3. I will take my medicines as prescribed. I will not change the dose or schedule unless my care team tells me to. There will be no refills if I run out early.     4. I may be asked to come to the clinic and complete a urine drug test or complete a pill count at any time. If I dont give a urine sample or participate in a pill count, the care team may stop my medicine.    5. I will only receive prescriptions from this clinic for chronic pain. If I am treated by another provider for acute pain issues, I will tell them that I am taking opioid pain medication for chronic pain and that I have a treatment agreement with this provider. I will inform my [facility] care team within one business day if I am given a prescription for any pain medication by another healthcare provider. My [facility] care team can contact other providers and pharmacists about my use of any medicines.    6. It is up to me to make sure that I dont run out of my medicines on weekends or holidays. If my care team is willing to refill my opioid prescription without a visit, I must request refills only during office hours. Refills may take up to 3 business  days to process. I will use one pharmacy to fill all my opioid and other controlled substance prescriptions. I will notify the clinic about any changes to my insurance or medication availability.    7. I am responsible for my prescriptions. If the medicine/prescription is lost, stolen or destroyed, it will not be replaced. I also agree not to share controlled substance medicines with anyone.    8. I am aware I should not use any illegal or recreational drugs. I agree not to drink alcohol unless my care team says I can.       9. If I enroll in the [place] Medical Cannabis program, I will tell my care team prior to my next refill.     10. I will tell my care team right away if I become pregnant, have a new medical problem treated outside of my regular clinic, or have a change in my medications.    11. I understand that this medicine can affect my thinking, judgment and reaction time. Alcohol and drugs affect the brain and body, which can affect the safety of my driving. Being under the influence of alcohol or drugs can affect my decision-making, behaviors, personal safety, and the safety of others. Driving while impaired (DWI) can occur if a person is driving, operating, or in physical control of a car, motorcycle, boat, snowmobile, ATV, motorbike, off-road vehicle, or any other motor vehicle ([place] Statute 169A.20). I understand the risk if I choose to drive or operate any vehicle or machinery.    I understand that if I do not follow any of the conditions above, my prescriptions or treatment may be stopped or changed.        Opioids  What You Need to Know    What are opioids?   Opioids are pain medicines that must be prescribed by a doctor. They are also known as narcotics.     Examples are:   1. morphine (MS Contin, Lamar)  2. oxycodone (Oxycontin)  3. oxycodone and acetaminophen (Percocet)  4. hydrocodone and acetaminophen (Vicodin, Norco)   5. fentanyl patch (Duragesic)   6. hydromorphone (Dilaudid)    7. methadone  8. codeine (Tylenol #3)     What do opioids do well?   Opioids are best for severe short-term pain such as after a surgery or injury. They may work well for cancer pain. They may help some people with long-lasting (chronic) pain.     What do opioids NOT do well?   Opioids never get rid of pain entirely, and they dont work well for most patients with chronic pain. Opioids dont reduce swelling, one of the causes of pain.                              Other ways to manage chronic pain and improve function include:       Treat the health problem that may be causing pain    Anti-inflammation medicines, which reduce swelling and tenderness, such as ibuprofen (Advil, Motrin) or naproxen (Aleve)    Acetaminophen (Tylenol)    Antidepressants and anti-seizure medicines, especially for nerve pain    Topical treatments such as patches or creams    Injections or nerve blocks    Chiropractic or osteopathic treatment    Acupuncture, massage, deep breathing, meditation, visual imagery, aromatherapy    Use heat or ice at the pain site    Physical therapy     Exercise    Stop smoking    Take part in therapy       Risks and side effects     Talk to your doctor before you start or decide to keep taking opioids. Possible side effects include:      Lowering your breathing rate enough to cause death    Overdose, including death, especially if taking higher than prescribed doses    Worse depression symptoms; less pleasure in things you usually enjoy    Feeling tired or sluggish    Slower thoughts or cloudy thinking    Being more sensitive to pain over time; pain is harder to control    Trouble sleeping or restless sleep    Changes in hormone levels (for example, less testosterone)    Changes in sex drive or ability to have sex    Constipation    Unsafe driving    Itching and sweating    Dizziness    Nausea, throwing up and dry mouth    What else should I know about opioids?    Opioids may lead to dependence, tolerance, or  addiction.      Dependence means that if you stop or reduce the medicine too quickly, you will have withdrawal symptoms. These include loose poop (diarrhea), jitters, flu-like symptoms, nervousness and tremors. Dependence is not the same as addiction.                   Tolerance means needing higher doses over time to get the same effect. This may increase the chance of serious side effects.      Addiction is when people improperly use a substance that harms their body, their mind or their relations with others. Use of opiates can cause a relapse of addiction if you have a history of drug or alcohol abuse.      People who have used opioids for a long time may have a lower quality of life, worse depression, higher levels of pain and more visits to doctors.    You can overdose on opioids. Take these steps to lower your risk of overdose:    1. Recognize the signs:  Signs of overdose include decrease or loss of consciousness (blackout), slowed breathing, trouble waking up and blue lips. If someone is worried about overdose, they should call 911.    2. Talk to your doctor about Narcan (naloxone).   If you are at risk for overdose, you may be given a prescription for Narcan. This medicine very quickly reverses the effects of opioids.   If you overdose, a friend or family member can give you Narcan while waiting for the ambulance. They need to know the signs of overdose and how to give Narcan.     3. Don't use alcohol or street drugs.   Taking them with opioids can cause death.    4. Do not take any of these medicines unless your doctor says its OK. Taking these with opioids can cause death:    Benzodiazepines, such as lorazepam (Ativan), alprazolam (Xanax) or diazepam (Valium)    Muscle relaxers, such as cyclobenzaprine (Flexeril)    Sleeping pills like zolpidem (Ambien)     Other opioids      How to keep you and other people safe while taking opioids:    1. Never share your opioids with others.  Opioid medicines are  regulated by the Drug Enforcement Agency (ELLIE). Selling or sharing medications is a criminal act.    2. Be sure to store opioids in a secure place, locked up if possible. Young children can easily swallow them and overdose.    3. When you are traveling with your medicines, keep them in the original bottles. If you use a pill box, be sure you also carry a copy of your medicine list from your clinic or pharmacy.    4. Safe disposal of opioids    Most pharmacies have places to get rid of medicine, called disposal kiosks. Medicine disposal options are also available in every [facility]. Search your county and medication disposal to find more options. You can find more details at:  https://www.pca.[email or web address]./living-green/managing-unwanted-medications     I agree that my provider, clinic care team, and pharmacy may work with any city, state or federal law enforcement agency that investigates the misuse, sale, or other diversion of my controlled medicine. I will allow my provider to discuss my care with, or share a copy of, this agreement with any other treating provider, pharmacy or emergency room where I receive care.    I have read this agreement and have asked questions about anything I did not understand.      __________________________________________________  Patient Signature - Kasia Quiroz   ______________________                      Date     __________________________________________________  Provider Signature - Lalitha Rivera MD     ______________________                      Date     __________________________________________________  Witness Signature (required if provider not present while patient signing)     ______________________                      Date

## 2021-07-04 NOTE — LETTER
Letter by Lalitha Rivera MD at      Author: Lalitha Rivera MD Service: -- Author Type: --    Filed:  Encounter Date: 6/4/2021 Status: (Other)       Non-Opioid Controlled Substance Agreement    This is an agreement between you and your provider regarding safe and appropriate controlled substance prescribing.? Controlled substances are?medicines that can cause physical and mental dependence. The manufacturing, possession and use of these medicines are regulated by law.  We here at [facility] are making a commitment to work with you in your efforts to get better.? To support you in this work, we will help you schedule regular appointments for medicine refills. If we must cancel or change your appointment for any reason, we will make sure you have enough medication to last until your next appointment.      As a Provider, I will:     Listen carefully to your concerns while treating you with dignity.     Recommend a treatment plan that I believe is in your best interest and may involve therapies other than medication.      Review the chance of benefit and the chance of harm of this medicine with you regularly and evaluate the safety and effectiveness of this therapy.       Provide a plan on how to discontinue if the decision is made to stop this medicine.       As a Patient, I understand controlled substances:       Are prescribed by my care provider to help me function or work and manage my condition(s).?    Are strong medicines and can cause serious side effects.      Need to be taken exactly as prescribed.?Combining controlled substances with certain medicines or chemicals (such as illegal drugs, alcohol, sedatives, sleeping pills, and benzodiazepines) can be dangerous or even fatal.? If I stop taking my medicines suddenly, I may have severe withdrawal symptoms.     The risks, benefits, and side effects of these medicine(s) were explained to me. I agree that:    1. I will take part in other  treatments as advised by my care team. This may be psychiatry or counseling, physical therapy, behavioral therapy, group treatment or a referral to specialist.    2. I will keep all my appointments and understand this is part of the monitoring of controlled substance.?My care team may require an office visit for EVERY controlled substance refill. If I miss appointments or dont follow instructions, my care team may stop my medicine    3. I will take my medicines as prescribed. I will not change the dose or schedule unless my care team tells me to. There will be no refills if I run out early.      4. I may be contactedwithout warning and asked to complete a urine drug test or pill count at any time. If I dont give a urine sample or participate in a pill count, the care team may stop my medicine.    5. I will only receive controlled substance prescriptions from this clinic. If treated by another provider, I will let them know I am taking controlled substances and that I have a treatment agreement with this provider. I will inform this care team within one business day if I am given a prescription for any controlled substance by another healthcare provider. This care team may contact other providers and pharmacists about my use of the medicines.     6. It is up to me to make sure that I do not run out of my medicines on weekends or holidays.?If my care team is willing to refill my prescription without a visit, I must request refills only during office hours. Refills may take up to 3 business days to process.  I will use one pharmacy to fill all my controlled substance prescriptions.  I will notify the clinic if any changes are made due to insurance changes or medication availability.    7. I am responsible for my prescriptions. If the medicine/prescription is lost, stolen or destroyed, it will not be replaced.?I also agree not to share controlled substance medicines with anyone.     8. I am aware I should not use any  illegal or recreational drugs. I agree not to drink alcohol unless my care team says I can.     9. If I enroll in the [place] Medical Cannabis program, I will tell my care team.?    10. I will tell my care team right away if I become pregnant, have a new medical problem treated outside of my regular clinic, or have a change in my medications.     11. I understand that this medicine can affect my thinking, judgment and reaction time.? Alcohol and drugs affect the brain and body, which can affect the safety of a persons driving. Being under the influence of alcohol or drugs can affect a persons decision-making, behaviors, personal safety, and the safety of others. Driving while impaired (DWI) can occur if a person is driving, operating, or in physical control of a car, motorcycle, boat, snowmobile, ATV, motorbike, off-road vehicle, or any other motor vehicle ([place] Statute 169A.20). I understand the risk if I choose to drive or operate machinery  I understand that if I do not follow any of the conditions above, my prescriptions or treatment may be stopped or changed.     I agree that my provider, clinic care team, and pharmacy may work with any city, state or federal law enforcement agency that investigates the misuse, sale, or other diversion of my controlled medicine. I will allow my provider to discuss my care with or share a copy of this agreement with any other treating provider, pharmacy or emergency room where I receive care.?    I have read this agreement and have asked questions about anything I did not understand.    ________________________________________________________  Patient Signature - Kasia Quiroz     ___________________                   Date     ________________________________________________________  Provider Signature - Lalitha Rivera MD       ___________________                   Date     ________________________________________________________  Witness Signature (required if provider not  present while patient signing)          ___________________                   Date

## 2021-07-04 NOTE — LETTER
Letter by Lalitha Rivera MD at      Author: Lalitha Rivera MD Service: -- Author Type: --    Filed:  Encounter Date: 6/4/2021 Status: (Other)         June 5, 2021     Patient: Kasia Quiroz   YOB: 1965   Date of Visit: 6/4/2021       To Whom it May Concern:    Kasia Quiroz was seen in my clinic on 6/4/2021.    If you have any questions or concerns, please don't hesitate to call.    Sincerely,         Electronically signed by Lalitha Rivera MD

## 2021-07-06 VITALS
RESPIRATION RATE: 16 BRPM | HEART RATE: 86 BPM | DIASTOLIC BLOOD PRESSURE: 60 MMHG | BODY MASS INDEX: 21.01 KG/M2 | HEIGHT: 66 IN | OXYGEN SATURATION: 96 % | WEIGHT: 130.75 LBS | TEMPERATURE: 98.1 F | SYSTOLIC BLOOD PRESSURE: 100 MMHG

## 2021-07-14 PROBLEM — F32.A DEPRESSION: Status: RESOLVED | Noted: 2021-04-27 | Resolved: 2021-04-27

## 2021-07-15 ENCOUNTER — OFFICE VISIT (OUTPATIENT)
Dept: URGENT CARE | Facility: URGENT CARE | Age: 56
End: 2021-07-15
Payer: COMMERCIAL

## 2021-07-15 VITALS
SYSTOLIC BLOOD PRESSURE: 107 MMHG | OXYGEN SATURATION: 97 % | HEART RATE: 89 BPM | BODY MASS INDEX: 21.02 KG/M2 | DIASTOLIC BLOOD PRESSURE: 73 MMHG | TEMPERATURE: 98 F | WEIGHT: 130 LBS

## 2021-07-15 DIAGNOSIS — R30.0 DYSURIA: Primary | ICD-10-CM

## 2021-07-15 DIAGNOSIS — N30.00 ACUTE CYSTITIS WITHOUT HEMATURIA: ICD-10-CM

## 2021-07-15 LAB
ALBUMIN UR-MCNC: NEGATIVE MG/DL
APPEARANCE UR: CLEAR
BACTERIA #/AREA URNS HPF: ABNORMAL /HPF
BILIRUB UR QL STRIP: NEGATIVE
COLOR UR AUTO: YELLOW
GLUCOSE UR STRIP-MCNC: NEGATIVE MG/DL
HGB UR QL STRIP: ABNORMAL
KETONES UR STRIP-MCNC: NEGATIVE MG/DL
LEUKOCYTE ESTERASE UR QL STRIP: ABNORMAL
NITRATE UR QL: NEGATIVE
PH UR STRIP: 7 [PH] (ref 5–7)
RBC #/AREA URNS AUTO: ABNORMAL /HPF
SP GR UR STRIP: 1.02 (ref 1–1.03)
SQUAMOUS #/AREA URNS AUTO: ABNORMAL /LPF
TRANS CELLS #/AREA URNS HPF: ABNORMAL /HPF
UROBILINOGEN UR STRIP-ACNC: 0.2 E.U./DL
WBC #/AREA URNS AUTO: >100 /HPF
WBC CLUMPS #/AREA URNS HPF: PRESENT /HPF

## 2021-07-15 PROCEDURE — 81001 URINALYSIS AUTO W/SCOPE: CPT | Performed by: PHYSICIAN ASSISTANT

## 2021-07-15 PROCEDURE — 99213 OFFICE O/P EST LOW 20 MIN: CPT | Performed by: PHYSICIAN ASSISTANT

## 2021-07-15 PROCEDURE — 87086 URINE CULTURE/COLONY COUNT: CPT | Performed by: PHYSICIAN ASSISTANT

## 2021-07-15 RX ORDER — NITROFURANTOIN 25; 75 MG/1; MG/1
100 CAPSULE ORAL 2 TIMES DAILY
Qty: 14 CAPSULE | Refills: 0 | Status: SHIPPED | OUTPATIENT
Start: 2021-07-15 | End: 2021-07-22

## 2021-07-15 NOTE — PROGRESS NOTES
Dysuria  - UA Macro with Reflex to Micro and Culture - lab collect; Future  - UA Macro with Reflex to Micro and Culture - lab collect  - Urine Microscopic Exam  - Urine Culture  - nitroFURantoin macrocrystal-monohydrate (MACROBID) 100 MG capsule; Take 1 capsule (100 mg) by mouth 2 times daily for 7 days    Acute cystitis without hematuria  - nitroFURantoin macrocrystal-monohydrate (MACROBID) 100 MG capsule; Take 1 capsule (100 mg) by mouth 2 times daily for 7 days    20 minutes spent on the date of the encounter doing chart review, history and exam, documentation and further activities per the note     See Patient Instructions  Patient Instructions     Patient Education     Bladder Infection, Female (Adult)     Urine normally doesn't have any germs (bacteria) in it. But bacteria can get into the urinary tract from the skin around the rectum. Or they can travel in the blood from other parts of the body. Once they are in your urinary tract, they can cause infection in these areas:    The urethra (urethritis)    The bladder (cystitis)    The kidneys (pyelonephritis)  The most common place for an infection is in the bladder. This is called a bladder infection. This is one of the most common infections in women. Most bladder infections are easily treated. They are not serious unless the infection spreads to the kidney.  The terms bladder infection, UTI, and cystitis are often used to describe the same thing. But they are not always the same. Cystitis is an inflammation of the bladder. The most common cause of cystitis is an infection.  Symptoms  The infection causes inflammation in the urethra and bladder. This causes many of the symptoms. The most common symptoms of a bladder infection are:    Pain or burning when urinating    Having to urinate more often than normal    Urgent need to urinate    Only a small amount of urine comes out    Blood in urine    Belly (abdominal) discomfort. This is often in the lower belly  above the pubic bone.    Cloudy urine    Strong- or bad-smelling urine    Unable to urinate (urinary retention)    Unable to hold urine in (urinary incontinence)    Fever    Loss of appetite    Confusion (in older adults)  Causes  Bladder infections are not contagious. You can't get one from someone else, from a toilet seat, or from sharing a bath.  The most common cause of bladder infections is bacteria from the bowels. The bacteria get onto the skin around the opening of the urethra. From there, they can get into the urine. Then they travel up to the bladder, causing inflammation and infection. This often happens because of:    Wiping incorrectly after urinating. Always wipe from front to back.    Bowel incontinence    Pregnancy    Procedures such as having a catheter put in    Older age    Not emptying your bladder. This can give bacteria a chance to grow in your urine.    Fluid loss (dehydration)    Constipation    Having sex    Using a diaphragm for birth control   Treatment  Bladder infections are diagnosed by a urine test and urine culture. They are treated with antibiotics. They often clear up quickly without problems. Treatment helps prevent a more serious kidney infection.  Medicines  Medicines can help in the treatment of a bladder infection:    Take antibiotics until they are used up, even if you feel better. It's important to finish them to make sure the infection has cleared.    You can use acetaminophen or ibuprofen for pain, fever, or discomfort, unless another medicine was prescribed. If you have long-term (chronic) liver or kidney disease, talk with your healthcare provider before using these medicines. Also talk with your provider if you've ever had a stomach ulcer or GI (gastrointestinal) bleeding, or are taking blood-thinner medicines.    If you are given phenazopydridine to reduce burning with urination, it will make your urine a bright orange color. This can stain clothing.  Care and  prevention  These self-care steps can help prevent future infections:    Drink plenty of fluids. This helps to prevent dehydration and flush out your bladder. Do this unless you must restrict fluids for other health reasons, or your healthcare provider told you not to.    Clean yourself correctly after going to the bathroom. Wipe from front to back after using the toilet. This helps prevent the spread of bacteria.    Urinate more often. Don't try to hold urine in for a long time.    Wear loose-fitting clothes and cotton underwear. Don't wear tight-fitting pants.    Improve your diet and prevent constipation. Eat more fresh fruits and vegetables, and fiber. Eat less junk foods and fatty foods.    Don't have sex until your symptoms are gone.    Don't have caffeine, alcohol, and spicy foods. These can irritate your bladder.    Urinate right after you have sex to flush out your bladder.    If you use birth control pills and have frequent bladder infections, discuss it with your healthcare provider.  Follow-up care  Call your healthcare provider if all symptoms are not gone after 3 days of treatment. This is especially important if you have repeat infections.  If a culture was done, you will be told if your treatment needs to be changed. If directed, you can call to find out the results.  If X-rays were done, you will be told if the results will affect your treatment.  Call 911  Call 911 if any of the following occur:    Trouble breathing    Hard to wake up or confusion    Fainting (loss of consciousness)    Fast heart rate  When to get medical advice  Call your healthcare provider right away if any of these occur:    Fever of 100.4 F (38.0 C) or higher, or as directed by your healthcare provider    Symptoms are not better after 3 days of treatment    Back or belly pain that gets worse    Repeated vomiting, or unable to keep medicine down    Weakness or dizziness    Vaginal discharge    Pain, redness, or swelling in the  outer vaginal area (labia)  [employer] To last reviewed this educational content on 11/1/2019 2000-2021 [employer]. All rights reserved. This information is not intended as a substitute for professional medical care. Always follow your healthcare professional's instructions.               [facility]    Subjective   56 year old who presents to clinic today for the following health issues:    Urgent Care and UTI       HPI     Genitourinary - Female  Onset/Duration: 2 days  Description:   Painful urination (Dysuria): YES           Frequency: YES  Blood in urine (Hematuria): YES  Delay in urine (Hesitency): no  Intensity: moderate  Progression of Symptoms:  worsening  Accompanying Signs & Symptoms:  Fever/chills: no  Flank pain: no  Nausea and vomiting: no  Vaginal symptoms: none  Abdominal/Pelvic Pain: YES  History:   History of frequent UTI s: YES  History of kidney stones: no  Sexually Active: YES (One partner)   Possibility of pregnancy: No  Precipitating or alleviating factors: None  Therapies tried and outcome: Cranberry juice prn (contraindicated in Coumadin patients) and Increase fluid intake    Review of Systems   Review of Systems   See HPI     Objective    Temp: 98  F (36.7  C) Temp src: Oral BP: 107/73 Pulse: 89     SpO2: 97 %       Physical Exam   Physical Exam  Constitutional:       General: She is not in acute distress.     Appearance: Normal appearance. She is normal weight. She is not ill-appearing, toxic-appearing or diaphoretic.   HENT:      Head: Normocephalic and atraumatic.   Cardiovascular:      Rate and Rhythm: Normal rate and regular rhythm.      Pulses: Normal pulses.      Heart sounds: Normal heart sounds. No murmur heard.   No friction rub. No gallop.    Pulmonary:      Effort: Pulmonary effort is normal. No respiratory distress.      Breath sounds: Normal breath sounds. No stridor. No wheezing, rhonchi or rales.   Chest:      Chest wall:  No tenderness.   Abdominal:      General: Abdomen is flat. Bowel sounds are normal. There is no distension.      Palpations: Abdomen is soft. There is no mass.      Tenderness: There is no abdominal tenderness. There is no right CVA tenderness, left CVA tenderness, guarding or rebound.      Hernia: No hernia is present.   Neurological:      General: No focal deficit present.      Mental Status: She is alert and oriented to person, place, and time. Mental status is at baseline.      Gait: Gait normal.   Psychiatric:         Mood and Affect: Mood normal.         Behavior: Behavior normal.         Thought Content: Thought content normal.         Judgment: Judgment normal.          Results for orders placed or performed in visit on 07/15/21 (from the past 24 hour(s))   UA Macro with Reflex to Micro and Culture - lab collect    Specimen: Urine, Clean Catch   Result Value Ref Range    Color Urine Yellow Colorless, Straw, Light Yellow, Yellow    Appearance Urine Clear Clear    Glucose Urine Negative Negative mg/dL    Bilirubin Urine Negative Negative    Ketones Urine Negative Negative mg/dL    Specific Gravity Urine 1.020 1.003 - 1.035    Blood Urine Trace (A) Negative    pH Urine 7.0 5.0 - 7.0    Protein Albumin Urine Negative Negative mg/dL    Urobilinogen Urine 0.2 0.2, 1.0 E.U./dL    Nitrite Urine Negative Negative    Leukocyte Esterase Urine Large (A) Negative

## 2021-07-15 NOTE — PATIENT INSTRUCTIONS

## 2021-07-17 LAB — BACTERIA UR CULT: NO GROWTH

## 2021-07-19 ENCOUNTER — MYC MEDICAL ADVICE (OUTPATIENT)
Dept: FAMILY MEDICINE | Facility: CLINIC | Age: 56
End: 2021-07-19

## 2021-07-19 DIAGNOSIS — F98.8 ATTENTION DEFICIT DISORDER (ADD) WITHOUT HYPERACTIVITY: Primary | ICD-10-CM

## 2021-07-20 RX ORDER — LISDEXAMFETAMINE DIMESYLATE 20 MG/1
20 CAPSULE ORAL EVERY MORNING
Qty: 30 CAPSULE | Refills: 0 | Status: SHIPPED | OUTPATIENT
Start: 2021-07-20 | End: 2021-08-19

## 2021-07-21 RX ORDER — ARIPIPRAZOLE 5 MG/1
5 TABLET ORAL
COMMUNITY
End: 2021-11-04

## 2021-07-21 RX ORDER — FLUOXETINE 40 MG/1
CAPSULE ORAL
COMMUNITY
Start: 2021-06-06 | End: 2022-03-23

## 2021-08-19 ENCOUNTER — MYC REFILL (OUTPATIENT)
Dept: FAMILY MEDICINE | Facility: CLINIC | Age: 56
End: 2021-08-19

## 2021-08-19 DIAGNOSIS — F98.8 ATTENTION DEFICIT DISORDER (ADD) WITHOUT HYPERACTIVITY: ICD-10-CM

## 2021-08-20 ENCOUNTER — MYC REFILL (OUTPATIENT)
Dept: FAMILY MEDICINE | Facility: CLINIC | Age: 56
End: 2021-08-20

## 2021-08-20 DIAGNOSIS — F98.8 ATTENTION DEFICIT DISORDER (ADD) WITHOUT HYPERACTIVITY: ICD-10-CM

## 2021-08-23 RX ORDER — LISDEXAMFETAMINE DIMESYLATE 20 MG/1
20 CAPSULE ORAL EVERY MORNING
Qty: 30 CAPSULE | Refills: 0 | Status: SHIPPED | OUTPATIENT
Start: 2021-08-23 | End: 2021-09-29

## 2021-08-23 NOTE — TELEPHONE ENCOUNTER
Chart reviewed, CSA on file.   Vyvanse last refilled on 7/20/21 for 1 month.   PDMP appropriate.       Will refill for 1 month.       Lila Hilton MD

## 2021-08-23 NOTE — TELEPHONE ENCOUNTER
Routing refill request to provider for review/approval because:  Controlled substance request    Last Written Prescription Date:  7/20/21  Last Fill Quantity: 30,  # refills: 0   Last office visit provider:  6/4/21     Requested Prescriptions   Pending Prescriptions Disp Refills     lisdexamfetamine (VYVANSE) 20 MG capsule 30 capsule 0     Sig: Take 1 capsule (20 mg) by mouth every morning       There is no refill protocol information for this order          Sarabjit Hatfield RN 08/23/21 10:00 AM

## 2021-08-24 RX ORDER — LISDEXAMFETAMINE DIMESYLATE 20 MG/1
20 CAPSULE ORAL EVERY MORNING
Qty: 30 CAPSULE | Refills: 0 | OUTPATIENT
Start: 2021-08-24

## 2021-09-26 ENCOUNTER — HEALTH MAINTENANCE LETTER (OUTPATIENT)
Age: 56
End: 2021-09-26

## 2021-09-29 ENCOUNTER — MYC REFILL (OUTPATIENT)
Dept: FAMILY MEDICINE | Facility: CLINIC | Age: 56
End: 2021-09-29

## 2021-09-29 DIAGNOSIS — F98.8 ATTENTION DEFICIT DISORDER (ADD) WITHOUT HYPERACTIVITY: ICD-10-CM

## 2021-10-01 RX ORDER — LISDEXAMFETAMINE DIMESYLATE 20 MG/1
20 CAPSULE ORAL EVERY MORNING
Qty: 30 CAPSULE | Refills: 0 | Status: SHIPPED | OUTPATIENT
Start: 2021-10-01 | End: 2021-11-03

## 2021-10-01 NOTE — TELEPHONE ENCOUNTER
Routing refill request to provider for review/approval because:  Drug not on the [facility] refill protocol     Last Written Prescription Date: 8/23/21  Last Fill Quantity: 30,  # refills: 0  Last office visit provider:  6/4/21     Requested Prescriptions   Pending Prescriptions Disp Refills     lisdexamfetamine (VYVANSE) 20 MG capsule 30 capsule 0     Sig: Take 1 capsule (20 mg) by mouth every morning       There is no refill protocol information for this order          Taylor mead RN 09/30/21 8:05 PM

## 2021-10-18 ENCOUNTER — OFFICE VISIT (OUTPATIENT)
Dept: URGENT CARE | Facility: URGENT CARE | Age: 56
End: 2021-10-18
Payer: COMMERCIAL

## 2021-10-18 ENCOUNTER — ANCILLARY PROCEDURE (OUTPATIENT)
Dept: GENERAL RADIOLOGY | Facility: CLINIC | Age: 56
End: 2021-10-18
Attending: FAMILY MEDICINE
Payer: COMMERCIAL

## 2021-10-18 VITALS
DIASTOLIC BLOOD PRESSURE: 70 MMHG | OXYGEN SATURATION: 99 % | TEMPERATURE: 98 F | HEART RATE: 80 BPM | SYSTOLIC BLOOD PRESSURE: 116 MMHG

## 2021-10-18 DIAGNOSIS — M79.672 LEFT FOOT PAIN: ICD-10-CM

## 2021-10-18 DIAGNOSIS — S92.525A CLOSED NONDISPLACED FRACTURE OF MIDDLE PHALANX OF LESSER TOE OF LEFT FOOT, INITIAL ENCOUNTER: Primary | ICD-10-CM

## 2021-10-18 PROCEDURE — 73630 X-RAY EXAM OF FOOT: CPT | Mod: LT | Performed by: RADIOLOGY

## 2021-10-18 PROCEDURE — 99213 OFFICE O/P EST LOW 20 MIN: CPT | Performed by: FAMILY MEDICINE

## 2021-10-18 NOTE — PROGRESS NOTES
Assessment & Plan     Closed nondisplaced fracture of middle phalanx of lesser toe of left foot, initial encounter    - XR Foot Left G/E 3 Views; Future  - Ankle/Foot Bracing Supplies Order for DME - ONLY FOR DME    Fracture of 5th toe is confirmed.  Continue with RICE and NSAIDs prn comfort.  Continue with sammy-taping.  Placed in short boot for comfort and stability-- use x 3-4 weeks as toe heals.  Follow-up if no change or worsening sx prn.      Sayda Obando MD  [facility] Pedroza is a 56 year old who presents for the following health issues     HPI   Smashed foot into furniture one week ago last Monday.  Walked 9 miles on trip through [facility].  Sammy-taped pinky toe to 4th toe for support.  Continues to be painful and swollen.  Able to weight bear.        Review of Systems   Constitutional, HEENT, cardiovascular, pulmonary, GI, , musculoskeletal, neuro, skin, endocrine and psych systems are negative, except as otherwise noted.      Objective    /70   Pulse 80   Temp 98  F (36.7  C)   LMP 11/25/2008   SpO2 99%   There is no height or weight on file to calculate BMI.  Physical Exam   GENERAL: healthy, alert and no distress  EYES: Eyes grossly normal to inspection, PERRL and conjunctivae and sclerae normal  NECK: no adenopathy, no asymmetry, masses, or scars and thyroid normal to palpation  MS: no gross musculoskeletal defects noted, + swelling and pain in LEFT pinky toe and just below toe into midfoot.  SKIN: no suspicious lesions or rashes  PSYCH: mentation appears normal, affect normal/bright    Xray - Reviewed and interpreted by me.  + fracture of proximal phalanx of LEFT 5th toe

## 2021-11-03 ENCOUNTER — MYC REFILL (OUTPATIENT)
Dept: FAMILY MEDICINE | Facility: CLINIC | Age: 56
End: 2021-11-03

## 2021-11-03 DIAGNOSIS — F98.8 ATTENTION DEFICIT DISORDER (ADD) WITHOUT HYPERACTIVITY: ICD-10-CM

## 2021-11-03 DIAGNOSIS — Z76.0 ENCOUNTER FOR MEDICATION REFILL: Primary | ICD-10-CM

## 2021-11-04 RX ORDER — LISDEXAMFETAMINE DIMESYLATE 20 MG/1
20 CAPSULE ORAL EVERY MORNING
Qty: 30 CAPSULE | Refills: 0 | Status: SHIPPED | OUTPATIENT
Start: 2021-11-04 | End: 2021-12-06

## 2021-11-04 NOTE — TELEPHONE ENCOUNTER
Routing refill request to provider for review/approval because:  Controlled substance request    Last Written Prescription Date:  10/1/21  Last Fill Quantity: 30,  # refills: 0   Last office visit provider:  6/4/21     Requested Prescriptions   Pending Prescriptions Disp Refills     lisdexamfetamine (VYVANSE) 20 MG capsule 30 capsule 0     Sig: Take 1 capsule (20 mg) by mouth every morning       There is no refill protocol information for this order          Sarabjit Hatfield RN 11/04/21 3:00 PM

## 2021-12-06 ENCOUNTER — MYC REFILL (OUTPATIENT)
Dept: FAMILY MEDICINE | Facility: CLINIC | Age: 56
End: 2021-12-06
Payer: COMMERCIAL

## 2021-12-06 DIAGNOSIS — Z76.0 ENCOUNTER FOR MEDICATION REFILL: Primary | ICD-10-CM

## 2021-12-07 ENCOUNTER — MYC MEDICAL ADVICE (OUTPATIENT)
Dept: FAMILY MEDICINE | Facility: CLINIC | Age: 56
End: 2021-12-07
Payer: COMMERCIAL

## 2021-12-07 RX ORDER — LISDEXAMFETAMINE DIMESYLATE 20 MG/1
20 CAPSULE ORAL EVERY MORNING
Qty: 30 CAPSULE | Refills: 0 | Status: SHIPPED | OUTPATIENT
Start: 2021-12-07 | End: 2022-01-07

## 2021-12-07 NOTE — TELEPHONE ENCOUNTER
Routing refill request to provider for review/approval because:  Controlled substance request    Last Written Prescription Date:  11/4/21  Last Fill Quantity: 30,  # refills: 0   Last office visit provider:  6/4/21     Requested Prescriptions   Pending Prescriptions Disp Refills     lisdexamfetamine (VYVANSE) 20 MG capsule 30 capsule 0     Sig: Take 1 capsule (20 mg) by mouth every morning       There is no refill protocol information for this order          Sarabjit Hatfield RN 12/07/21 2:40 PM

## 2021-12-07 NOTE — TELEPHONE ENCOUNTER
Noe Pedroza -    Can you please set up a pap test and follow up appointment? When I saw you in June, I had asked for a 6 month follow up. At this time, I see no appointment scheduled. It will likely take you a few months to get in, so please schedule ASAP.  BLAYNE Rivera

## 2022-01-07 ENCOUNTER — MYC REFILL (OUTPATIENT)
Dept: FAMILY MEDICINE | Facility: CLINIC | Age: 57
End: 2022-01-07
Payer: COMMERCIAL

## 2022-01-07 DIAGNOSIS — Z76.0 ENCOUNTER FOR MEDICATION REFILL: Primary | ICD-10-CM

## 2022-01-10 NOTE — TELEPHONE ENCOUNTER
Routing refill request to provider for review/approval because:  Controlled substance request    Last Written Prescription Date:  12/7/21  Last Fill Quantity: 30,  # refills: 0   Last office visit provider:  6/4/21     Requested Prescriptions   Pending Prescriptions Disp Refills     lisdexamfetamine (VYVANSE) 20 MG capsule 30 capsule 0     Sig: Take 1 capsule (20 mg) by mouth every morning       There is no refill protocol information for this order          Sarabjit Hatfield RN 01/10/22 2:44 PM

## 2022-01-11 RX ORDER — LISDEXAMFETAMINE DIMESYLATE 20 MG/1
20 CAPSULE ORAL EVERY MORNING
Qty: 30 CAPSULE | Refills: 0 | Status: SHIPPED | OUTPATIENT
Start: 2022-01-11 | End: 2022-02-11

## 2022-02-11 ENCOUNTER — MYC REFILL (OUTPATIENT)
Dept: FAMILY MEDICINE | Facility: CLINIC | Age: 57
End: 2022-02-11
Payer: COMMERCIAL

## 2022-02-11 DIAGNOSIS — Z76.0 ENCOUNTER FOR MEDICATION REFILL: ICD-10-CM

## 2022-02-15 RX ORDER — LISDEXAMFETAMINE DIMESYLATE 20 MG/1
20 CAPSULE ORAL EVERY MORNING
Qty: 30 CAPSULE | Refills: 0 | Status: SHIPPED | OUTPATIENT
Start: 2022-02-15 | End: 2022-03-24

## 2022-02-15 NOTE — TELEPHONE ENCOUNTER
Routing refill request to provider for review/approval because:  Drug not on the [facility] refill protocol     lisdexamfetamine (VYVANSE) 20 MG capsule 30 capsule 0 1/11/2022  No   Sig - Route: Take 1 capsule (20 mg) by mouth every morning - Oral     LAST OV-6/4/21

## 2022-03-23 DIAGNOSIS — Z76.0 ENCOUNTER FOR MEDICATION REFILL: Primary | ICD-10-CM

## 2022-03-23 DIAGNOSIS — F33.0 MILD RECURRENT MAJOR DEPRESSION (H): ICD-10-CM

## 2022-03-23 DIAGNOSIS — F90.0 ATTENTION DEFICIT HYPERACTIVITY DISORDER (ADHD), PREDOMINANTLY INATTENTIVE TYPE: ICD-10-CM

## 2022-03-23 RX ORDER — FLUOXETINE 40 MG/1
CAPSULE ORAL
Qty: 90 CAPSULE | Refills: 0 | Status: SHIPPED | OUTPATIENT
Start: 2022-03-23 | End: 2022-06-20

## 2022-03-23 NOTE — TELEPHONE ENCOUNTER
"Outpatient Medication Detail     Disp Refills Start End GALO   FLUoxetine (PROZAC) 40 MG capsule 90 capsule 3 3/3/2021  No   Sig: Take one capsule by mouth every day.   Sent to pharmacy as: FLUoxetine 40 mg capsule (PROzac)   E-Prescribing Status: Receipt confirmed by pharmacy (3/3/2021  4:41 PM CST)     Last office visit provider:  6/4/21     Requested Prescriptions   Pending Prescriptions Disp Refills     lisdexamfetamine (VYVANSE) 20 MG capsule 30 capsule 0     Sig: Take 1 capsule (20 mg) by mouth every morning       There is no refill protocol information for this order        FLUoxetine (PROZAC) 40 MG capsule         SSRIs Protocol Passed - 3/23/2022  2:21 PM        Passed - Recent (12 mo) or future (30 days) visit within the authorizing provider's specialty     Patient has had an office visit with the authorizing provider or a provider within the authorizing providers department within the previous 12 mos or has a future within next 30 days. See \"Patient Info\" tab in inbasket, or \"Choose Columns\" in Meds & Orders section of the refill encounter.              Passed - Medication is active on med list        Passed - Patient is age 18 or older        Passed - No active pregnancy on record        Passed - No positive pregnancy test in last 12 months             Sarabjit Hatfield RN 03/23/22 2:27 PM  " (0) swallows foods/liquids without difficulty

## 2022-03-23 NOTE — TELEPHONE ENCOUNTER
"Routing refill request to provider for review/approval because:  Controlled substance request    Last Written Prescription Date:  2/15/22  Last Fill Quantity: 30,  # refills: 0   Last office visit provider:  6/4/21     Requested Prescriptions   Pending Prescriptions Disp Refills     lisdexamfetamine (VYVANSE) 20 MG capsule 30 capsule 0     Sig: Take 1 capsule (20 mg) by mouth every morning       There is no refill protocol information for this order      Signed Prescriptions Disp Refills    FLUoxetine (PROZAC) 40 MG capsule 90 capsule 0     Sig: TAKE 1 CAPSULE BY MOUTH EVERY DAY       SSRIs Protocol Passed - 3/23/2022  2:21 PM        Passed - Recent (12 mo) or future (30 days) visit within the authorizing provider's specialty     Patient has had an office visit with the authorizing provider or a provider within the authorizing providers department within the previous 12 mos or has a future within next 30 days. See \"Patient Info\" tab in inbasket, or \"Choose Columns\" in Meds & Orders section of the refill encounter.              Passed - Medication is active on med list        Passed - Patient is age 18 or older        Passed - No active pregnancy on record        Passed - No positive pregnancy test in last 12 months             Sarabjit Hatfield RN 03/23/22 2:29 PM  "

## 2022-03-23 NOTE — TELEPHONE ENCOUNTER
Reason for Call:  Medication or medication refill:    Do you use a [facility]?  Name of the pharmacy and phone number for the current request:  [id number] [place], [place] - [facility] [address] AT [facility] [address]  [phone]    Name of the medication requested:   - Lisdexamfetamine Dimesylate 20 MG Oral Capsule (Vyvanse)  - FLUoxetine (PROZAC) 40 MG capsule    Other request: Patient is all out of medication    Can we leave a detailed message on this number? YES    Phone number patient can be reached at: Cell number on file:    Telephone Information:   Mobile [phone]       Best Time: Anytime    Call taken on 3/23/2022 at 2:17 PM by Ally Quezada

## 2022-03-24 RX ORDER — LISDEXAMFETAMINE DIMESYLATE 20 MG/1
20 CAPSULE ORAL EVERY MORNING
Qty: 30 CAPSULE | Refills: 0 | Status: SHIPPED | OUTPATIENT
Start: 2022-03-24 | End: 2022-05-03

## 2022-04-11 ENCOUNTER — PATIENT OUTREACH (OUTPATIENT)
Dept: FAMILY MEDICINE | Facility: CLINIC | Age: 57
End: 2022-04-11
Payer: COMMERCIAL

## 2022-04-11 PROBLEM — R87.610 ASCUS WITH POSITIVE HIGH RISK HPV CERVICAL: Status: ACTIVE | Noted: 2019-03-20

## 2022-04-11 PROBLEM — R87.810 ASCUS WITH POSITIVE HIGH RISK HPV CERVICAL: Status: ACTIVE | Noted: 2019-03-20

## 2022-05-03 ENCOUNTER — OFFICE VISIT (OUTPATIENT)
Dept: FAMILY MEDICINE | Facility: CLINIC | Age: 57
End: 2022-05-03
Payer: COMMERCIAL

## 2022-05-03 VITALS
TEMPERATURE: 98.2 F | HEART RATE: 86 BPM | OXYGEN SATURATION: 96 % | BODY MASS INDEX: 21.26 KG/M2 | WEIGHT: 131.5 LBS | SYSTOLIC BLOOD PRESSURE: 100 MMHG | DIASTOLIC BLOOD PRESSURE: 60 MMHG | RESPIRATION RATE: 16 BRPM

## 2022-05-03 DIAGNOSIS — F32.1 CURRENT MODERATE EPISODE OF MAJOR DEPRESSIVE DISORDER, UNSPECIFIED WHETHER RECURRENT (H): ICD-10-CM

## 2022-05-03 DIAGNOSIS — Z00.00 PREVENTATIVE HEALTH CARE: ICD-10-CM

## 2022-05-03 DIAGNOSIS — Z76.0 ENCOUNTER FOR MEDICATION REFILL: ICD-10-CM

## 2022-05-03 DIAGNOSIS — F90.0 ATTENTION DEFICIT HYPERACTIVITY DISORDER (ADHD), PREDOMINANTLY INATTENTIVE TYPE: ICD-10-CM

## 2022-05-03 DIAGNOSIS — Z12.11 ENCOUNTER FOR SCREENING COLONOSCOPY: ICD-10-CM

## 2022-05-03 DIAGNOSIS — A63.0 ANAL WART: Primary | ICD-10-CM

## 2022-05-03 PROCEDURE — 99215 OFFICE O/P EST HI 40 MIN: CPT | Performed by: FAMILY MEDICINE

## 2022-05-03 RX ORDER — LISDEXAMFETAMINE DIMESYLATE 20 MG/1
20 CAPSULE ORAL EVERY MORNING
Qty: 30 CAPSULE | Refills: 0 | Status: SHIPPED | OUTPATIENT
Start: 2022-05-03 | End: 2022-06-02

## 2022-05-03 ASSESSMENT — PATIENT HEALTH QUESTIONNAIRE - PHQ9: SUM OF ALL RESPONSES TO PHQ QUESTIONS 1-9: 0

## 2022-05-03 NOTE — PROGRESS NOTES
Assessment & Plan     Anal wart  This has been pap'd in the past - was negative for dysplasia. Refer for colonoscopy eval. For the itch, she will use only water/ice and toilet paper, plus steroid cream for healing/anti-itch. She uses portable wet wipes and might also try witch hazel pads.  - Adult Gastro Ref - Procedure Only    Encounter for screening colonoscopy  Needs a screening colonoscopy  - Adult Gastro Ref - Procedure Only    Preventative health care  reviewed  - REVIEW OF HEALTH MAINTENANCE PROTOCOL ORDERS    Current moderate episode of major depressive disorder, unspecified whether recurrent (H)  Mood is well-managed at this time.    Encounter for medication refill  Reordered. Drug agreement is due in a couple months.  - lisdexamfetamine (VYVANSE) 20 MG capsule  Dispense: 30 capsule; Refill: 0    Attention deficit hyperactivity disorder (ADHD), predominantly inattentive type  Continuing Vyvanse at this time, while considering weaning off it in the future  - lisdexamfetamine (VYVANSE) 20 MG capsule  Dispense: 30 capsule; Refill: 0      Review of external notes as documented elsewhere in note  Ordering of each unique test  Prescription drug management  55 minutes spent on the date of the encounter doing chart review, history and exam, documentation and further activities per the note    Return in about 3 months (around 8/3/2022) for Follow up if anal itch does not improve, after colonoscopy.    Lalitha Rivera MD  [facility] CLARISSA Pedroza is a 57 year old who presents for the following health issues     HPI   Itchy bum. Sometimes my butt really itches.    Bowel habits are different - every morning - she expells mucous, no fecal matter; after eating, she poops. More mucous can come anytime during the day - with gas (no blood, just brownish/yellowish mucous). Going on 4 - 5 weeks. Some periods of itching drive her insane. She knows she has some areas that get inflamed at  times. She's tried many food eliminations - nothing is obvious. Emotionally she's OK.    Sleep - good  Exercise - some    Review of Systems       Objective    /60   Pulse 86   Temp 98.2  F (36.8  C) (Oral)   Resp 16   Wt 59.6 kg (131 lb 8 oz)   LMP 11/25/2008   SpO2 96%   BMI 21.26 kg/m    Body mass index is 21.26 kg/m .  Physical Exam   GENERAL: healthy, alert and no distress  RECTAL (female): external anus examined only  PSYCH: mentation appears normal, affect normal/bright    No results found for this or any previous visit (from the past 24 hour(s)).

## 2022-05-25 ENCOUNTER — PATIENT OUTREACH (OUTPATIENT)
Dept: FAMILY MEDICINE | Facility: CLINIC | Age: 57
End: 2022-05-25
Payer: COMMERCIAL

## 2022-05-25 ENCOUNTER — OFFICE VISIT (OUTPATIENT)
Dept: URGENT CARE | Facility: URGENT CARE | Age: 57
End: 2022-05-25
Payer: COMMERCIAL

## 2022-05-25 VITALS
TEMPERATURE: 99.8 F | SYSTOLIC BLOOD PRESSURE: 120 MMHG | DIASTOLIC BLOOD PRESSURE: 68 MMHG | OXYGEN SATURATION: 98 % | HEART RATE: 78 BPM | RESPIRATION RATE: 20 BRPM

## 2022-05-25 DIAGNOSIS — R50.9 FEVER, UNSPECIFIED FEVER CAUSE: ICD-10-CM

## 2022-05-25 DIAGNOSIS — R05.9 COUGH: Primary | ICD-10-CM

## 2022-05-25 PROCEDURE — U0003 INFECTIOUS AGENT DETECTION BY NUCLEIC ACID (DNA OR RNA); SEVERE ACUTE RESPIRATORY SYNDROME CORONAVIRUS 2 (SARS-COV-2) (CORONAVIRUS DISEASE [COVID-19]), AMPLIFIED PROBE TECHNIQUE, MAKING USE OF HIGH THROUGHPUT TECHNOLOGIES AS DESCRIBED BY CMS-2020-01-R: HCPCS | Performed by: PHYSICIAN ASSISTANT

## 2022-05-25 PROCEDURE — U0005 INFEC AGEN DETEC AMPLI PROBE: HCPCS | Performed by: PHYSICIAN ASSISTANT

## 2022-05-25 PROCEDURE — 99213 OFFICE O/P EST LOW 20 MIN: CPT | Mod: CS | Performed by: PHYSICIAN ASSISTANT

## 2022-05-25 RX ORDER — BENZONATATE 200 MG/1
200 CAPSULE ORAL 3 TIMES DAILY PRN
Qty: 30 CAPSULE | Refills: 0 | Status: SHIPPED | OUTPATIENT
Start: 2022-05-25 | End: 2022-12-15

## 2022-05-25 NOTE — PROGRESS NOTES
.  Assessment & Plan     Pt is an otherwise very healthy female with hx of hyperthyroidism, ADHD, allergic rhinnitis who presents with uri sx x 1.5 weeks.  Exam is reassuring, lungs CTA, normal vitally with no tachypnea or persistent high fevers, no chest pain or SOB. CXR is negative for any PNA, PTX, effusion.  This likely represents viral syndrome, recommend cough medication as ordered.   Push fluids, rest and ibuprofen or tylenol for comfort. I will call pt if there is a discreprency.  Pt agreeable with plan.    Cough    - Symptomatic; Yes; 5/16/2022 COVID-19 Virus (Coronavirus) by PCR Nose  - XR Chest 2 Views  - benzonatate (TESSALON) 200 MG capsule  Dispense: 30 capsule; Refill: 0    Fever, unspecified fever cause    - Symptomatic; Yes; 5/16/2022 COVID-19 Virus (Coronavirus) by PCR Nose  - XR Chest 2 Views  - benzonatate (TESSALON) 200 MG capsule  Dispense: 30 capsule; Refill: 0           Jenniffer Lew PA-C  [facility] Pedroza is a 57 year old female who presents to clinic today for the following health issues:  Chief Complaint   Patient presents with     Cough     COUGH/DIZZY/HEADACHE 1 WK      HPI  Pt presents to urgent care with 1 week hx of uri sx.    Fully vaccinated with booster x 2.    Ears full.   Has been ill for the last 1.5 weeks, started Monday 16 th, initially tickle in the throat followed by rhinorrhea, congestion, cough.     Sinuses full, nothing coming out nose. Lots of PND.  HA  Mucuinex, claritin, nasal steroid, somewhat helpful.    No facial pain or tooth pain.  Feels it is mostly in the chest currently    Productive cough, low grade fever.    Sob with active.    Moderate fatigue.    Traveled for work and several she traveled with had covid 19.    had long respiratory infection which was not covid 19 prior to travel.      Review of Systems   Constitutional, HEENT, cardiovascular, pulmonary, gi and gu systems are negative, except as otherwise  noted.      Objective    /68   Pulse 78   Temp 99.8  F (37.7  C)   Resp 20   LMP 11/25/2008   SpO2 98%   Physical Exam   Pt is in no acute distress and appears well  Ears patent B:  TM s intact, non-injected. All land marks easily visibile    Nasal mucosa is non-edematous, no discharge.    Pharynx: non erythematous, tonsils non hypertrophied, No exudate   Neck supple: no adenopathy  Lungs: CTA  Heart: RRR, no murmur, no thrills or heaves   Ext: no edema  Skin: no rashes      No results found for any visits on 05/25/22.   Per my independent evaluation negative for effusion, infiltrate, ptx.    Await radiology reading.

## 2022-05-26 LAB — SARS-COV-2 RNA RESP QL NAA+PROBE: NEGATIVE

## 2022-06-02 ENCOUNTER — MYC REFILL (OUTPATIENT)
Dept: FAMILY MEDICINE | Facility: CLINIC | Age: 57
End: 2022-06-02
Payer: COMMERCIAL

## 2022-06-02 DIAGNOSIS — Z76.0 ENCOUNTER FOR MEDICATION REFILL: ICD-10-CM

## 2022-06-02 DIAGNOSIS — F90.0 ATTENTION DEFICIT HYPERACTIVITY DISORDER (ADHD), PREDOMINANTLY INATTENTIVE TYPE: ICD-10-CM

## 2022-06-04 NOTE — TELEPHONE ENCOUNTER
Routing refill request to provider for review/approval because:  Drug not on the [facility] refill protocol     Last Written Prescription Date:  5/3/22  Last Fill Quantity: 30,  # refills: 0   Last office visit provider:  5/3/22     Requested Prescriptions   Pending Prescriptions Disp Refills     lisdexamfetamine (VYVANSE) 20 MG capsule 30 capsule 0     Sig: Take 1 capsule (20 mg) by mouth every morning       There is no refill protocol information for this order          Cara Gregory, RN 06/03/22 7:16 PM

## 2022-06-05 ENCOUNTER — NURSE TRIAGE (OUTPATIENT)
Dept: NURSING | Facility: CLINIC | Age: 57
End: 2022-06-05

## 2022-06-05 ENCOUNTER — OFFICE VISIT (OUTPATIENT)
Dept: URGENT CARE | Facility: URGENT CARE | Age: 57
End: 2022-06-05
Payer: COMMERCIAL

## 2022-06-05 VITALS
HEART RATE: 94 BPM | BODY MASS INDEX: 21.26 KG/M2 | DIASTOLIC BLOOD PRESSURE: 64 MMHG | OXYGEN SATURATION: 99 % | WEIGHT: 131.5 LBS | SYSTOLIC BLOOD PRESSURE: 106 MMHG | TEMPERATURE: 98.3 F

## 2022-06-05 DIAGNOSIS — L08.9 CAT BITE OF LEFT HAND WITH INFECTION, INITIAL ENCOUNTER: Primary | ICD-10-CM
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
## 2022-06-05 DIAGNOSIS — S61.452A CAT BITE OF LEFT HAND WITH INFECTION, INITIAL ENCOUNTER: Primary | ICD-10-CM

## 2022-06-05 PROCEDURE — 99213 OFFICE O/P EST LOW 20 MIN: CPT | Performed by: NURSE PRACTITIONER

## 2022-06-05 RX ORDER — LISDEXAMFETAMINE DIMESYLATE 20 MG/1
20 CAPSULE ORAL EVERY MORNING
Qty: 30 CAPSULE | Refills: 0 | Status: SHIPPED | OUTPATIENT
Start: 2022-06-05 | End: 2022-07-09

## 2022-06-05 NOTE — PROGRESS NOTES
Chief Complaint   Patient presents with     Urgent Care     Cat Bite     Left hand, swollen, redness, painful         ICD-10-CM    1. Cat bite of left hand with infection, initial encounter  S61.452A amoxicillin-clavulanate (AUGMENTIN) 875-125 MG tablet    L08.9     W55.01XA    Antibiotics as prescribed.  If erythema worsens despite antibiotics please go to the emergency room for further assessment and treatment.    Subjective     Kasia Quiroz is an 57 year old female who presents to clinic today for multiple punctures by her cat yesterday. Cat is current on vaccinations. Today left hand is very swollen and red.       ROS: 10 point ROS neg other than the symptoms noted above in the HPI.       Objective    /64   Pulse 94   Temp 98.3  F (36.8  C) (Tympanic)   Wt 59.6 kg (131 lb 8 oz)   LMP 11/25/2008   SpO2 99%   BMI 21.26 kg/m    Nurses notes and VS have been reviewed.    Physical Exam       GENERAL APPEARANCE: healthy appearing, alert     MS: extremities normal- no gross deformities noted; normal muscle tone.     SKIN: Moderate erythema of the left volar surface of the hand with 4 puncture wounds present, no drainage, very hot to the touch     NEURO: Normal strength and tone, mentation intact and speech normal     PSYCH: normal thought process; no significant mood disturbance    Patient Instructions   Take medications as prescribed.  If symptoms worsen please go to the emergency room.      TIRSO Benavides, CNP  [facility] Provider    The use of Dragon/GenKyo[employer] dictation services may have been used to construct the content in this note; any grammatical or spelling errors are non-intentional. Please contact the author of this note directly if you are in need of any clarification.

## 2022-06-06 NOTE — TELEPHONE ENCOUNTER
Patient just at [facility] today and antibiotic sent to closed [facility] pharmacy.  Patient should be able to get prescription pulled over at open [facility] pharmacy and caller will go to [facility] in [place] on [address].    Karla Otero RN  [facility]

## 2022-06-20 DIAGNOSIS — Z76.0 ENCOUNTER FOR MEDICATION REFILL: Primary | ICD-10-CM

## 2022-06-20 DIAGNOSIS — F33.0 MILD RECURRENT MAJOR DEPRESSION (H): ICD-10-CM

## 2022-06-20 DIAGNOSIS — F90.0 ATTENTION DEFICIT HYPERACTIVITY DISORDER (ADHD), PREDOMINANTLY INATTENTIVE TYPE: ICD-10-CM

## 2022-06-21 RX ORDER — FLUOXETINE 40 MG/1
CAPSULE ORAL
Qty: 90 CAPSULE | Refills: 3 | Status: SHIPPED | OUTPATIENT
Start: 2022-06-21 | End: 2023-08-11

## 2022-07-14 ENCOUNTER — MYC REFILL (OUTPATIENT)
Dept: FAMILY MEDICINE | Facility: CLINIC | Age: 57
End: 2022-07-14

## 2022-07-14 DIAGNOSIS — Z76.0 ENCOUNTER FOR MEDICATION REFILL: ICD-10-CM

## 2022-07-14 DIAGNOSIS — F90.0 ATTENTION DEFICIT HYPERACTIVITY DISORDER (ADHD), PREDOMINANTLY INATTENTIVE TYPE: ICD-10-CM

## 2022-07-15 RX ORDER — LISDEXAMFETAMINE DIMESYLATE 20 MG/1
20 CAPSULE ORAL EVERY MORNING
Qty: 30 CAPSULE | Refills: 0 | OUTPATIENT
Start: 2022-07-15

## 2022-07-21 NOTE — TELEPHONE ENCOUNTER
Hi Dr. Rivera,   Patient is lost to pap tracking follow-up. Attempts to contact pt have been made per reminder process and there has been no reply and/or no appt scheduled.       Pap Hx:  1993 abnormal, had colposcopy   1998 NIL  6/10/09 ASCUS, neg HPV  6/14/12 NIL  3/20/19 ASCUS, +HR HPV (not 16/18)  8/7/19 NIL pap, +HR HPV (not 16/18)  4/27/21 NIL pap, neg HPV. Plan: cotest in 1 year  05/3/22 appt scheduled, notes added-- pap not done  05/25/22 Reminder Mychart  06/23/22 Reminder call-lm  07/21/22 Lost to follow-up for pap tracking, elsy routed to provider

## 2022-08-27 ENCOUNTER — HEALTH MAINTENANCE LETTER (OUTPATIENT)
Age: 57
End: 2022-08-27

## 2022-08-29 ENCOUNTER — MYC REFILL (OUTPATIENT)
Dept: FAMILY MEDICINE | Facility: CLINIC | Age: 57
End: 2022-08-29

## 2022-08-29 DIAGNOSIS — Z76.0 ENCOUNTER FOR MEDICATION REFILL: ICD-10-CM

## 2022-08-29 DIAGNOSIS — F90.0 ATTENTION DEFICIT HYPERACTIVITY DISORDER (ADHD), PREDOMINANTLY INATTENTIVE TYPE: ICD-10-CM

## 2022-08-29 RX ORDER — LISDEXAMFETAMINE DIMESYLATE 20 MG/1
20 CAPSULE ORAL EVERY MORNING
Qty: 30 CAPSULE | Refills: 0 | Status: SHIPPED | OUTPATIENT
Start: 2022-08-29 | End: 2022-10-02

## 2022-10-02 ENCOUNTER — MYC REFILL (OUTPATIENT)
Dept: FAMILY MEDICINE | Facility: CLINIC | Age: 57
End: 2022-10-02

## 2022-10-02 DIAGNOSIS — F90.0 ATTENTION DEFICIT HYPERACTIVITY DISORDER (ADHD), PREDOMINANTLY INATTENTIVE TYPE: ICD-10-CM

## 2022-10-02 DIAGNOSIS — Z76.0 ENCOUNTER FOR MEDICATION REFILL: ICD-10-CM

## 2022-10-03 RX ORDER — LISDEXAMFETAMINE DIMESYLATE 20 MG/1
20 CAPSULE ORAL EVERY MORNING
Qty: 30 CAPSULE | Refills: 0 | Status: SHIPPED | OUTPATIENT
Start: 2022-10-03 | End: 2022-11-06

## 2022-12-14 ENCOUNTER — MYC REFILL (OUTPATIENT)
Dept: FAMILY MEDICINE | Facility: CLINIC | Age: 57
End: 2022-12-14

## 2022-12-14 DIAGNOSIS — F90.0 ATTENTION DEFICIT HYPERACTIVITY DISORDER (ADHD), PREDOMINANTLY INATTENTIVE TYPE: ICD-10-CM

## 2022-12-14 DIAGNOSIS — Z76.0 ENCOUNTER FOR MEDICATION REFILL: ICD-10-CM

## 2022-12-15 RX ORDER — LISDEXAMFETAMINE DIMESYLATE 20 MG/1
20 CAPSULE ORAL EVERY MORNING
Qty: 30 CAPSULE | Refills: 0 | Status: SHIPPED | OUTPATIENT
Start: 2022-12-15 | End: 2023-01-19

## 2023-01-19 ENCOUNTER — MYC REFILL (OUTPATIENT)
Dept: FAMILY MEDICINE | Facility: CLINIC | Age: 58
End: 2023-01-19
Payer: COMMERCIAL

## 2023-01-19 DIAGNOSIS — Z76.0 ENCOUNTER FOR MEDICATION REFILL: ICD-10-CM

## 2023-01-19 DIAGNOSIS — F90.0 ATTENTION DEFICIT HYPERACTIVITY DISORDER (ADHD), PREDOMINANTLY INATTENTIVE TYPE: ICD-10-CM

## 2023-01-20 RX ORDER — LISDEXAMFETAMINE DIMESYLATE 20 MG/1
20 CAPSULE ORAL EVERY MORNING
Qty: 30 CAPSULE | Refills: 0 | Status: SHIPPED | OUTPATIENT
Start: 2023-01-20 | End: 2023-02-23

## 2023-03-24 ENCOUNTER — MYC REFILL (OUTPATIENT)
Dept: FAMILY MEDICINE | Facility: CLINIC | Age: 58
End: 2023-03-24
Payer: COMMERCIAL

## 2023-03-24 DIAGNOSIS — F90.0 ATTENTION DEFICIT HYPERACTIVITY DISORDER (ADHD), PREDOMINANTLY INATTENTIVE TYPE: ICD-10-CM

## 2023-03-24 DIAGNOSIS — Z76.0 ENCOUNTER FOR MEDICATION REFILL: ICD-10-CM

## 2023-03-25 RX ORDER — LISDEXAMFETAMINE DIMESYLATE 20 MG/1
20 CAPSULE ORAL EVERY MORNING
Qty: 28 CAPSULE | Refills: 0 | Status: SHIPPED | OUTPATIENT
Start: 2023-03-25 | End: 2023-04-28

## 2023-04-23 ENCOUNTER — HEALTH MAINTENANCE LETTER (OUTPATIENT)
Age: 58
End: 2023-04-23

## 2023-04-28 ENCOUNTER — MYC REFILL (OUTPATIENT)
Dept: FAMILY MEDICINE | Facility: CLINIC | Age: 58
End: 2023-04-28
Payer: COMMERCIAL

## 2023-04-28 DIAGNOSIS — F90.0 ATTENTION DEFICIT HYPERACTIVITY DISORDER (ADHD), PREDOMINANTLY INATTENTIVE TYPE: ICD-10-CM

## 2023-04-28 DIAGNOSIS — Z76.0 ENCOUNTER FOR MEDICATION REFILL: ICD-10-CM

## 2023-04-29 RX ORDER — LISDEXAMFETAMINE DIMESYLATE 20 MG/1
20 CAPSULE ORAL EVERY MORNING
Qty: 28 CAPSULE | Refills: 0 | Status: SHIPPED | OUTPATIENT
Start: 2023-04-29 | End: 2023-05-30

## 2023-04-29 NOTE — TELEPHONE ENCOUNTER
Routing refill request to provider for review/approval because:  Drug not on the G refill protocol     Last Written Prescription Date:  3/25/23  Last Fill Quantity: 28,  # refills: 0   Last office visit provider:  5/3/22     Requested Prescriptions   Pending Prescriptions Disp Refills     lisdexamfetamine (VYVANSE) 20 MG capsule 28 capsule 0     Sig: Take 1 capsule (20 mg) by mouth every morning       There is no refill protocol information for this order          Zhanna Parker RN 04/29/23 5:29 AM

## 2023-05-22 ENCOUNTER — OFFICE VISIT (OUTPATIENT)
Dept: FAMILY MEDICINE | Facility: CLINIC | Age: 58
End: 2023-05-22
Payer: COMMERCIAL

## 2023-05-22 VITALS
OXYGEN SATURATION: 97 % | BODY MASS INDEX: 22.18 KG/M2 | HEIGHT: 66 IN | HEART RATE: 83 BPM | DIASTOLIC BLOOD PRESSURE: 74 MMHG | RESPIRATION RATE: 16 BRPM | TEMPERATURE: 98.7 F | SYSTOLIC BLOOD PRESSURE: 110 MMHG | WEIGHT: 138.04 LBS

## 2023-05-22 DIAGNOSIS — H65.92 FLUID LEVEL BEHIND TYMPANIC MEMBRANE OF LEFT EAR: Primary | ICD-10-CM

## 2023-05-22 DIAGNOSIS — R26.89 BALANCE PROBLEMS: ICD-10-CM

## 2023-05-22 DIAGNOSIS — H93.8X2 SENSATION OF PLUGGED EAR ON LEFT SIDE: ICD-10-CM

## 2023-05-22 DIAGNOSIS — J30.9 ALLERGIC RHINITIS, UNSPECIFIED SEASONALITY, UNSPECIFIED TRIGGER: ICD-10-CM

## 2023-05-22 PROCEDURE — 99213 OFFICE O/P EST LOW 20 MIN: CPT | Performed by: FAMILY MEDICINE

## 2023-05-22 ASSESSMENT — PATIENT HEALTH QUESTIONNAIRE - PHQ9
10. IF YOU CHECKED OFF ANY PROBLEMS, HOW DIFFICULT HAVE THESE PROBLEMS MADE IT FOR YOU TO DO YOUR WORK, TAKE CARE OF THINGS AT HOME, OR GET ALONG WITH OTHER PEOPLE: NOT DIFFICULT AT ALL
SUM OF ALL RESPONSES TO PHQ QUESTIONS 1-9: 0
SUM OF ALL RESPONSES TO PHQ QUESTIONS 1-9: 0

## 2023-05-22 NOTE — PATIENT INSTRUCTIONS
-Thank you for choosing the [facility].  -It was a pleasure to see you today.  -Please take a look at the information below for more specific details regarding the treatment plan and recommendations.  -In this after visit summary is a list of your medications and specific instructions.  Please review this carefully as there may be changes made to your medication list.  -If there are any particular questions or concerns, please feel free to reach out to Dr. Craven.  -If any labs have been completed, we will reach out to you about results.  If the results are normal or not concerning, a letter or MyChart message will be sent to you.  If any follow-up is needed, either Dr. Craven or the nurse will give you a call.  If you have not heard regarding results after 2 weeks, please reach out to the clinic.    Patient Instructions:    -Use the Flonase 2 sprays in each nostril once daily going forward.  -Nasal spray medications: when using the nasal spray medication, put the tip of the medication bottle in about 1/2 inch into the nose, turn the tip so that it is pointing towards the outer eyebrow on that same side, and then spray. You do not have to sniff or do anything special. If you get a taste of the medication in the back of your throat right away, then you need to turn the tip of the bottle more outwards.   -Use Claritin 10 mg once daily for allergy symptoms.  -You may use the Astepro as well.  -Do sinus rinses once daily.  -Do the colleen maneuver as discussed.  Videos teaching this maneuver can be found online.    -Please schedule an appointment to see the ENT doctor in about 2-3 weeks. If you do not hear from the specialist to schedule an appointment within a week's time from today, please call the [facility] and speak with the specialty  to help you schedule the appointment to see the specialist.      -The preventive measures below are recommended for you.  Please let Dr. Craven know  when you are ready to complete them.    Health Maintenance Due   Topic Date Due    DEPRESSION ACTION PLAN  Never done    HEPATITIS B IMMUNIZATION (1 of 3 - 3-dose series) Never done    ZOSTER IMMUNIZATION (1 of 2) Never done    PAP FOLLOW-UP  04/27/2022    HPV FOLLOW-UP  04/27/2022    YEARLY PREVENTIVE VISIT  06/04/2022    COVID-19 Vaccine (5 - Moderna series) 06/24/2022    INFLUENZA VACCINE (1) 09/01/2022    ANNUAL REVIEW OF HM ORDERS  05/03/2023    MAMMO SCREENING  06/04/2023     -The Shingles/Shingrix vaccine is generally better covered by insurance companies if the vaccine is received at a pharmacy.  Please speak with your pharmacist regarding this vaccination as it is recommended for you.    Please seek immediate medical attention (go to the emergency room or urgent care) for the following reasons: worsening symptoms, fevers, chills, trouble swallowing, trouble breathing, nausea, vomiting, or any concerning changes.    Please return to clinic in 1 month for follow-up and a general physical, or sooner as needed.      --------------------------------------------------------------------------------------------------------------------    -We are always looking for ways to improve.  You may be selected to receive a survey regarding your visit today.  We encourage you to complete the survey and provide specific, constructive feedback to help us improve our processes.  Thank you for your time!  -Please review the contact information listed on the after visit summary and in the electronic chart.  Below is the phone number that we have on file.  If there are any changes that are needed to be made, please reach out to the clinic.  [phone] (home)

## 2023-05-22 NOTE — PROGRESS NOTES
OFFICE VISIT    Assessment/Plan:     Patient Instructions:    -Use the Flonase 2 sprays in each nostril once daily going forward.  -Nasal spray medications: when using the nasal spray medication, put the tip of the medication bottle in about 1/2 inch into the nose, turn the tip so that it is pointing towards the outer eyebrow on that same side, and then spray. You do not have to sniff or do anything special. If you get a taste of the medication in the back of your throat right away, then you need to turn the tip of the bottle more outwards.   -Use Claritin 10 mg once daily for allergy symptoms.  -You may use the Astepro as well.  -Do sinus rinses once daily.  -Do the colleen maneuver as discussed.  Videos teaching this maneuver can be found online.      -Please schedule an appointment to see the ENT doctor in about 2-3 weeks. If you do not hear from the specialist to schedule an appointment within a week's time from today, please call the [facility] and speak with the specialty  to help you schedule the appointment to see the specialist.      -The preventive measures below are recommended for you.  Please let Dr. Craven know when you are ready to complete them.    Health Maintenance Due   Topic Date Due     DEPRESSION ACTION PLAN  Never done     HEPATITIS B IMMUNIZATION (1 of 3 - 3-dose series) Never done     ZOSTER IMMUNIZATION (1 of 2) Never done     PAP FOLLOW-UP  04/27/2022     HPV FOLLOW-UP  04/27/2022     YEARLY PREVENTIVE VISIT  06/04/2022     COVID-19 Vaccine (5 - Moderna series) 06/24/2022     INFLUENZA VACCINE (1) 09/01/2022     ANNUAL REVIEW OF HM ORDERS  05/03/2023     MAMMO SCREENING  06/04/2023     -The Shingles/Shingrix vaccine is generally better covered by insurance companies if the vaccine is received at a pharmacy.  Please speak with your pharmacist regarding this vaccination as it is recommended for you.    Please seek immediate medical attention (go to the emergency room or urgent  care) for the following reasons: worsening symptoms, fevers, chills, trouble swallowing, trouble breathing, nausea, vomiting, or any concerning changes.    Please return to clinic in 1 month for follow-up and a general physical, or sooner as needed.      Kasia was seen today for trouble hearing on left ear.  Diagnoses and all orders for this visit:    Fluid level behind tympanic membrane of left ear  Allergic rhinitis, unspecified seasonality, unspecified trigger  Sensation of plugged ear on left side  Balance problems: Clear fluid noted in the middle ear on the left.  Symptoms patient bloating is likely due to middle ear effusion.  Patient does have allergic rhinitis at baseline, which have seemed to worsen recently, likely triggering the symptoms.  No signs of infection at this time.  Due to chronicity of symptoms, plan to refer to ENT for further evaluation.  Reviewed appropriate usage of allergy medications.  Plan as above.  -     Adult ENT  Referral; Future    In regards to preventive health recommendations, she will schedule an appointment to see her PCP to complete these.    The diagnoses, treatment options, risk, benefits, and recommendations were reviewed with patient/guardian.  Questions were answered to patient's/guardian satisfaction.  Red flag signs were reviewed.  Patient/guardian is in agreement with above plan.      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  Do NOT count diet or artificially sweetened beverages)?: 0  How many minutes a day do you exercise enough to make your heart beat faster?: 9 or less  How many days a week do you exercise enough to make your heart beat faster?: 3 or less  How many days per week do you miss taking your medication?: 0    What is the reason for your visit today?: Left ear feels full.  Occasionally off balance when walking.  Substantial post nasal drip on same side of nose.  When did your symptoms begin?: 3-4 weeks ago  How would you describe these symptoms?: Mild  Are your symptoms:: Staying the same  Have you had these symptoms before?: No    When symptoms started, she did not have a cold/flulike illness, though she has a lot of allergies. No sinus infection symptoms that patient can note. At some point, it was just soap in her ear that didn't clear out after showering.  Since things have started, symptoms haven't haven't changed and just never went away. She feels like her seasonal allergy symptoms have been worsening. The post-nasal drip is substantial now. She usually uses flonase, though has been thinking about trying a new OTC nasal spray for allergies (asteropro). She takes claritin and also does saline nasal rinses intermittently.  She has been consistent with the Flonase, though has not been consistent with the other medications.    Denies recent fevers, chills, coughing, shortness of breath, nausea, vomiting, falls, injuries, or other changes from baseline. Eating and drinking normally. Denies any trouble with swallowing or breathing.    Patient does have a history of high risk HPV.  Patient has never smoked before.     7/15/2021  5:02 PM 10/18/2021  12:53 PM 5/3/2022  7:23 AM 5/25/2022  3:53 PM 6/5/2022  6:28 PM 5/22/2023  8:55 AM   Vital Signs         Weight  "(LB) 130 lb   131 lb 8 oz   131 lb 8 oz  138 lb 0.6 oz        HM due was reviewed with patient/parent.  Recommendations, risk, benefits were reviewed.  Accepted recommendations were ordered.  Otherwise, patient/parent declined.    Health Maintenance Due   Topic Date Due     DEPRESSION ACTION PLAN  Never done     HEPATITIS B IMMUNIZATION (1 of 3 - 3-dose series) Never done     ZOSTER IMMUNIZATION (1 of 2) Never done     PAP FOLLOW-UP  04/27/2022     HPV FOLLOW-UP  04/27/2022     YEARLY PREVENTIVE VISIT  06/04/2022     COVID-19 Vaccine (5 - Moderna series) 06/24/2022     INFLUENZA VACCINE (1) 09/01/2022     ANNUAL REVIEW OF HM ORDERS  05/03/2023     MAMMO SCREENING  06/04/2023     [facility] records:   COVID-19  04/08/2021  1 of 3  Spikevax-MOD 100mcg  Full    No    COVID-19  05/06/2021  2 of 3  Spikevax-MOD 100mcg  Full    No    COVID-19  12/18/2021  3 of 3  Comirnaty-PFR 30mcg  Full    No    COVID-19  04/29/2022  4 of 3  Spikevax-MOD 50mcg RED  Full    No    Influenza  03/04/2008  Booster   Full    No    Influenza  12/08/2015  Booster   Full    No    Influenza  12/28/2017  Booster  Fluzone quad pfree  Full    No    Influenza  10/02/2018  Booster  Fluzone quad pfree  Full    No    Influenza  11/16/2020  Booster   Full    No    Td/Tdap  06/28/2004  1 of 4  Td 7+ yrs  Full    No    Td/Tdap  04/07/2014  2 of 4  Tdap  Full    No         The 10 point review of system is negative except as stated in the HPI.    Allergies were reviewed and updated.    Objective:   /74   Pulse 83   Temp 98.7  F (37.1  C) (Oral)   Resp 16   Ht 1.675 m (5' 5.95\")   Wt 62.6 kg (138 lb 0.6 oz)   LMP 11/25/2008   SpO2 97%   BMI 22.32 kg/m    General: Active, alert, nontoxic-appearing.  No acute distress.  Normal ambulation.  HEENT: Normocephalic, atraumatic.  Pupils are equal and round.  Sclera is clear.  Ears: Normal external ear.  Normal, patent ear canal. Tympanic membrane: pearly, gray, transparent. Middle ear fluid is noted on the " left.  Middle ear fluid is clear.  No middle ear fluid is noted on the right..  Nasal discharge is noted to be clear. Sinuses are not tender to palpation. No tenderness of palpation of mastoid bones. Oropharynx: moist mucous membranes.  Postnasal drip with clear fluid noted.  Erythema of the posterior oropharynx is not present. Tonsils do not have erythematous/exudates. No masses, fluctuance, crepitus is noted of the neck.  Cervical lymphadenopathy is not noted to palpation.   Cardiac: RRR.  S1, S2 present.  No murmurs, rubs, or gallops.  Respiratory/chest: Clear to auscultation bilaterally.  No wheezes, rales, rhonchi.  Breathing is not labored.  No accessory muscle usage.  Extremities: Voluntary movements intact.  Integumentary: No concerning rash or skin changes appreciated.        Chalo Craven MD  [facility] [place] [address]  Phone: [phone]  Fax: [phone]    5/23/2023  2:56 PM            Current Outpatient Medications   Medication     FLUoxetine (PROZAC) 40 MG capsule     lisdexamfetamine (VYVANSE) 20 MG capsule     No current facility-administered medications for this visit.       Allergies   Allergen Reactions     Cat Dander [Animal Dander] Hives     Cat Hair Extract Hives     Dog Dander [Dog Epithelium Allergy Skin Test] Hives     Other Environmental Allergy Unknown     Seasonal       Patient Active Problem List    Diagnosis Date Noted     Hyperthyroidism 04/27/2021     Priority: Medium     Current moderate episode of major depressive disorder, unspecified whether recurrent (H) 01/19/2021     Priority: Medium     ADHD, Predominantly Inattentive Type      Priority: Medium     Stable on Vyvanse for 5 years.  6 month checks; as of mid-2020, she's   trying a supplement regimen and is off of Vyvanse  Replacement Utility updated for latest IMO load         ASCUS with positive high risk HPV cervical 03/20/2019     Priority: Medium     1993 abnormal, had colposcopy   1998  NIL  6/10/09 ASCUS, neg HPV  12 NIL  3/20/19 ASCUS, +HR HPV (not 16/18)  19 NIL pap, +HR HPV (not 16/18)  21 NIL pap, neg HPV. Plan: cotest in 1 year  05/3/22 appt scheduled, notes added-- pap not done  22 Lost to follow-up for pap tracking, fyi routed to provider         Cervical Dysplasia      Priority: Medium     Created by [facility] [facility] Annotation: 2009  3:36PM - [facility] Modi: HPV  1993 had colposcopy and treatment         Herpes Simplex Type II      Priority: Medium     Created by Conversion  Replacement Utility updated for latest IMO load         Vitamin D Deficiency      Priority: Medium     Created by Conversion  Replacement Utility updated for latest IMO load         Controlled substance agreement signed 2015     Priority: Medium     17         Allergic rhinitis 2015     Priority: Medium       Family History   Problem Relation Age of Onset     Acute Myocardial Infarction Father 71.00     Aortic aneurysm Father      Breast Cancer Maternal Aunt      Heart Disease Mother 85.00     Thyroid Disease Mother      Alcoholism Sister      Mental Illness Brother         schizophrenia     Hypertension Brother      Cancer Brother 60.00        bladder (no alcoholism)     Cancer Brother 60.00        prostate (no alcoholism)     Alcoholism Brother      Alcoholism Brother      Thyroid Disease Daughter 8.00        [employer]     Other - See Comments Son         concussions       Past Surgical History:   Procedure Laterality Date     COLPOSCOPY W/ BIOPSY / CURETTAGE        BREAST CYST ASPIRATION RIGHT Right 7/3/2019     [facility]  DELIVERY ONLY  ,     Description:  Section;  Recorded: 2009;  Comments: x 2 (for breech, repeat)        Social History     Socioeconomic History     Marital status:      Spouse name: Not on file     Number of children: Not on file     Years of education: Not on file     Highest education level: Not on file    Occupational History     Not on file   Tobacco Use     Smoking status: Never     Passive exposure: Yes     Smokeless tobacco: Never     Tobacco comments:     passive and on/off in college   Vaping Use     Vaping status: Never Used     Passive vaping exposure: Yes   Substance and Sexual Activity     Alcohol use: Yes     Comment: Alcoholic Drinks/day: family history of alcoholism     Drug use: No     Sexual activity: Yes     Partners: Male     Birth control/protection: Surgical     Comment: vasectomy   Other Topics Concern     Not on file   Social History Narrative    1/2021     with 2 kids    Family is important  Friendships, too    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

## 2023-05-23 ENCOUNTER — PATIENT OUTREACH (OUTPATIENT)
Dept: CARE COORDINATION | Facility: CLINIC | Age: 58
End: 2023-05-23
Payer: COMMERCIAL

## 2023-05-30 ENCOUNTER — MYC REFILL (OUTPATIENT)
Dept: FAMILY MEDICINE | Facility: CLINIC | Age: 58
End: 2023-05-30
Payer: COMMERCIAL

## 2023-05-30 DIAGNOSIS — Z76.0 ENCOUNTER FOR MEDICATION REFILL: ICD-10-CM

## 2023-05-30 DIAGNOSIS — F90.0 ATTENTION DEFICIT HYPERACTIVITY DISORDER (ADHD), PREDOMINANTLY INATTENTIVE TYPE: ICD-10-CM

## 2023-06-01 RX ORDER — LISDEXAMFETAMINE DIMESYLATE 20 MG/1
20 CAPSULE ORAL EVERY MORNING
Qty: 28 CAPSULE | Refills: 0 | Status: SHIPPED | OUTPATIENT
Start: 2023-06-01 | End: 2023-07-01

## 2023-07-01 ENCOUNTER — MYC REFILL (OUTPATIENT)
Dept: FAMILY MEDICINE | Facility: CLINIC | Age: 58
End: 2023-07-01
Payer: COMMERCIAL

## 2023-07-01 DIAGNOSIS — F90.0 ATTENTION DEFICIT HYPERACTIVITY DISORDER (ADHD), PREDOMINANTLY INATTENTIVE TYPE: ICD-10-CM

## 2023-07-01 DIAGNOSIS — Z76.0 ENCOUNTER FOR MEDICATION REFILL: ICD-10-CM

## 2023-07-02 RX ORDER — LISDEXAMFETAMINE DIMESYLATE 20 MG/1
20 CAPSULE ORAL EVERY MORNING
Qty: 28 CAPSULE | Refills: 0 | Status: SHIPPED | OUTPATIENT
Start: 2023-07-02 | End: 2023-07-31

## 2023-07-31 ENCOUNTER — MYC REFILL (OUTPATIENT)
Dept: FAMILY MEDICINE | Facility: CLINIC | Age: 58
End: 2023-07-31
Payer: COMMERCIAL

## 2023-07-31 DIAGNOSIS — F90.0 ATTENTION DEFICIT HYPERACTIVITY DISORDER (ADHD), PREDOMINANTLY INATTENTIVE TYPE: ICD-10-CM

## 2023-07-31 DIAGNOSIS — Z76.0 ENCOUNTER FOR MEDICATION REFILL: ICD-10-CM

## 2023-07-31 RX ORDER — LISDEXAMFETAMINE DIMESYLATE 20 MG/1
20 CAPSULE ORAL EVERY MORNING
Qty: 28 CAPSULE | Refills: 0 | Status: SHIPPED | OUTPATIENT
Start: 2023-07-31 | End: 2023-08-11

## 2023-08-11 ENCOUNTER — OFFICE VISIT (OUTPATIENT)
Dept: FAMILY MEDICINE | Facility: CLINIC | Age: 58
End: 2023-08-11
Payer: COMMERCIAL

## 2023-08-11 VITALS
BODY MASS INDEX: 21.57 KG/M2 | RESPIRATION RATE: 16 BRPM | HEART RATE: 84 BPM | DIASTOLIC BLOOD PRESSURE: 69 MMHG | WEIGHT: 134.25 LBS | HEIGHT: 66 IN | OXYGEN SATURATION: 98 % | TEMPERATURE: 98.1 F | SYSTOLIC BLOOD PRESSURE: 102 MMHG

## 2023-08-11 DIAGNOSIS — N89.8 VAGINAL DRYNESS: ICD-10-CM

## 2023-08-11 DIAGNOSIS — F90.0 ATTENTION DEFICIT HYPERACTIVITY DISORDER (ADHD), PREDOMINANTLY INATTENTIVE TYPE: ICD-10-CM

## 2023-08-11 DIAGNOSIS — R00.2 PALPITATIONS: ICD-10-CM

## 2023-08-11 DIAGNOSIS — F33.0 MILD RECURRENT MAJOR DEPRESSION (H): ICD-10-CM

## 2023-08-11 DIAGNOSIS — N87.9 DYSPLASIA OF CERVIX: ICD-10-CM

## 2023-08-11 DIAGNOSIS — F32.1 CURRENT MODERATE EPISODE OF MAJOR DEPRESSIVE DISORDER, UNSPECIFIED WHETHER RECURRENT (H): ICD-10-CM

## 2023-08-11 DIAGNOSIS — Z12.31 VISIT FOR SCREENING MAMMOGRAM: ICD-10-CM

## 2023-08-11 DIAGNOSIS — R87.810 ASCUS WITH POSITIVE HIGH RISK HPV CERVICAL: ICD-10-CM

## 2023-08-11 DIAGNOSIS — Z12.4 CERVICAL CANCER SCREENING: ICD-10-CM

## 2023-08-11 DIAGNOSIS — E55.9 VITAMIN D DEFICIENCY: ICD-10-CM

## 2023-08-11 DIAGNOSIS — Z00.00 PREVENTATIVE HEALTH CARE: Primary | ICD-10-CM

## 2023-08-11 DIAGNOSIS — A60.00 HERPES SIMPLEX INFECTION OF GENITOURINARY SYSTEM: ICD-10-CM

## 2023-08-11 DIAGNOSIS — Z76.0 ENCOUNTER FOR MEDICATION REFILL: ICD-10-CM

## 2023-08-11 DIAGNOSIS — R87.610 ASCUS WITH POSITIVE HIGH RISK HPV CERVICAL: ICD-10-CM

## 2023-08-11 LAB
ATRIAL RATE - MUSE: 73 BPM
CLUE CELLS: ABNORMAL
DIASTOLIC BLOOD PRESSURE - MUSE: NORMAL MMHG
INTERPRETATION ECG - MUSE: NORMAL
P AXIS - MUSE: 64 DEGREES
PR INTERVAL - MUSE: 124 MS
QRS DURATION - MUSE: 70 MS
QT - MUSE: 392 MS
QTC - MUSE: 431 MS
R AXIS - MUSE: 19 DEGREES
SYSTOLIC BLOOD PRESSURE - MUSE: NORMAL MMHG
T AXIS - MUSE: 50 DEGREES
TRICHOMONAS, WET PREP: ABNORMAL
VENTRICULAR RATE- MUSE: 73 BPM
WBC'S/HIGH POWER FIELD, WET PREP: ABNORMAL
YEAST, WET PREP: ABNORMAL

## 2023-08-11 PROCEDURE — 99396 PREV VISIT EST AGE 40-64: CPT | Performed by: FAMILY MEDICINE

## 2023-08-11 PROCEDURE — 99214 OFFICE O/P EST MOD 30 MIN: CPT | Mod: 25 | Performed by: FAMILY MEDICINE

## 2023-08-11 PROCEDURE — 93005 ELECTROCARDIOGRAM TRACING: CPT | Performed by: FAMILY MEDICINE

## 2023-08-11 PROCEDURE — 87210 SMEAR WET MOUNT SALINE/INK: CPT | Performed by: FAMILY MEDICINE

## 2023-08-11 PROCEDURE — G0145 SCR C/V CYTO,THINLAYER,RESCR: HCPCS | Performed by: FAMILY MEDICINE

## 2023-08-11 PROCEDURE — 87624 HPV HI-RISK TYP POOLED RSLT: CPT | Performed by: FAMILY MEDICINE

## 2023-08-11 RX ORDER — FLUOXETINE 40 MG/1
40 CAPSULE ORAL DAILY
Qty: 90 CAPSULE | Refills: 3 | Status: SHIPPED | OUTPATIENT
Start: 2023-08-11 | End: 2024-08-19

## 2023-08-11 RX ORDER — LISDEXAMFETAMINE DIMESYLATE 20 MG/1
20 CAPSULE ORAL EVERY MORNING
Qty: 28 CAPSULE | Refills: 0 | Status: SHIPPED | OUTPATIENT
Start: 2023-08-29 | End: 2023-09-29

## 2023-08-11 ASSESSMENT — ENCOUNTER SYMPTOMS
WEAKNESS: 1
NAUSEA: 0
ABDOMINAL PAIN: 0
EYE PAIN: 0
HEARTBURN: 0
HEMATURIA: 0
SHORTNESS OF BREATH: 0
MYALGIAS: 0
DIARRHEA: 0
HEMATOCHEZIA: 0
FREQUENCY: 0
DIZZINESS: 1
CONSTIPATION: 0
NERVOUS/ANXIOUS: 1
PALPITATIONS: 1
CHILLS: 0
COUGH: 0
ARTHRALGIAS: 1
SORE THROAT: 0
PARESTHESIAS: 0
HEADACHES: 0
JOINT SWELLING: 0
FEVER: 0
DYSURIA: 0

## 2023-08-11 NOTE — PROGRESS NOTES
SUBJECTIVE:   CC: Kasia is an 58 year old who presents for preventive health visit.       8/11/2023     7:01 AM   Additional Questions   Roomed by Eliecer LIGHT       Healthy Habits:     Getting at least 3 servings of Calcium per day:  Yes    Bi-annual eye exam:  Yes    Dental care twice a year:  Yes    Sleep apnea or symptoms of sleep apnea:  None    Diet:  Regular (no restrictions)    Frequency of exercise:  None    Taking medications regularly:  Yes    Medication side effects:  None    Additional concerns today:  No    Has had some palpitations pop up, sometimes with some chest pressure. These happen approximately    When she stands after sitting a while, she has side thigh pain. It is sharp immediately but there as a dull ache all the time.  Has no taken any meds for this.    Has not been exercising. Fell off that routine with Covid and has not resumed it. She knows she needs to do it.    Social History     Tobacco Use    Smoking status: Never     Passive exposure: Yes    Smokeless tobacco: Never    Tobacco comments:     passive and on/off in college   Substance Use Topics    Alcohol use: Yes     Comment: Alcoholic Drinks/day: family history of alcoholism             8/11/2023     6:57 AM   Alcohol Use   Prescreen: >3 drinks/day or >7 drinks/week? Yes   AUDIT SCORE  6         8/11/2023     6:57 AM   AUDIT - Alcohol Use Disorders Identification Test - Reproduced from the World Health Organization Audit 2001 (Second Edition)   1.  How often do you have a drink containing alcohol? 4 or more times a week   2.  How many drinks containing alcohol do you have on a typical day when you are drinking? 1 or 2   3.  How often do you have five or more drinks on one occasion? Never   4.  How often during the last year have you found that you were not able to stop drinking once you had started? Less than monthly   5.  How often during the last year have you failed to do what was normally expected of you because of drinking?  Never   6.  How often during the last year have you needed a first drink in the morning to get yourself going after a heavy drinking session? Never   7.  How often during the last year have you had a feeling of guilt or remorse after drinking? Less than monthly   8.  How often during the last year have you been unable to remember what happened the night before because of your drinking? Never   9.  Have you or someone else been injured because of your drinking? No   10. Has a relative, friend, doctor or other health care worker been concerned about your drinking or suggested you cut down? No   TOTAL SCORE 6     Reviewed orders with patient.  Reviewed health maintenance and updated orders accordingly - Yes  Labs reviewed in EPIC  BP Readings from Last 3 Encounters:   23 102/69   23 110/74   22 106/64    Wt Readings from Last 3 Encounters:   23 60.9 kg (134 lb 4 oz)   23 62.6 kg (138 lb 0.6 oz)   22 59.6 kg (131 lb 8 oz)                  Patient Active Problem List   Diagnosis    Herpes Simplex Type II    Cervical Dysplasia    ADHD, Predominantly Inattentive Type    Vitamin D Deficiency    Allergic rhinitis    Controlled substance agreement signed    Current moderate episode of major depressive disorder, unspecified whether recurrent (H)    Hyperthyroidism    ASCUS with positive high risk HPV cervical     Past Surgical History:   Procedure Laterality Date    COLPOSCOPY W/ BIOPSY / CURETTAGE       BREAST CYST ASPIRATION RIGHT Right 7/3/2019    [facility]  DELIVERY ONLY  ,     Description:  Section;  Recorded: 2009;  Comments: x 2 (for breech, repeat)       Social History     Tobacco Use    Smoking status: Never     Passive exposure: Yes    Smokeless tobacco: Never    Tobacco comments:     passive and on/off in college   Substance Use Topics    Alcohol use: Yes     Comment: Alcoholic Drinks/day: family history of alcoholism     Family History   Problem  Relation Age of Onset    Heart Disease Mother 85         at 89    Thyroid Disease Mother     Acute Myocardial Infarction Father 71    Aortic aneurysm Father     Alcoholism Sister     Mental Illness Brother         schizophrenia    Hypertension Brother     Cancer Brother 60        bladder (no alcoholism)    Cancer Brother 60        prostate (no alcoholism)    Alcoholism Brother     Alcoholism Brother     Thyroid Disease Daughter 8        [place]    Other - See Comments Son         concussions    Breast Cancer Maternal Aunt          Current Outpatient Medications   Medication Sig Dispense Refill    FLUoxetine (PROZAC) 40 MG capsule TAKE 1 CAPSULE BY MOUTH EVERY DAY 90 capsule 3    lisdexamfetamine (VYVANSE) 20 MG capsule Take 1 capsule (20 mg) by mouth every morning 28 capsule 0       Breast Cancer Screenin/11/2023     6:58 AM   Breast CA Risk Assessment (FHS-7)   Do you have a family history of breast, colon, or ovarian cancer? No / Unknown         Mammogram Screening: Recommended mammography every 1-2 years with patient discussion and risk factor consideration  Pertinent mammograms are reviewed under the imaging tab.    History of abnormal Pap smear: YES - updated in Problem List and Health Maintenance accordingly      Latest Ref Rng & Units 2021    11:02 AM 2019     3:06 PM 3/20/2019     8:10 PM   PAP / HPV   PAP Negative for squamous intraepithelial lesion or malignancy. Negative for squamous intraepithelial lesion or malignancy  Electronically signed by Priyanka Stringer CT (ASCP) on 2021 at 10:19 AM    Negative for squamous intraepithelial lesion or malignancy  Electronically signed by Anny Cochran CT (ASCP) on 8/15/2019 at 11:28 AM    Atypical squamous cells of undetermined significance  Electronically signed by Deborah Givens MD on 2019 at  4:55 PM      HPV 16 DNA NEG Negative  Negative  Negative    HPV 18 DNA NEG Negative  Negative  Negative    Other HR HPV NEG  "Negative  Positive  Positive      Reviewed and updated as needed this visit by clinical staff   Tobacco  Allergies  Meds              Reviewed and updated as needed this visit by Provider                 Past Medical History:   Diagnosis Date    Abnormal Pap smear of cervix 03/2019    ASCUS with \"other\" HPV positive; repeat later 2019    Abnormal Pap smear of cervix 03/2019    ASCUS with \"other\" HPV positive; repeat later 2019    ADHD 2007    on Vyvanse; tried 2020 to taper off, but could not;    ADHD 2007    on Vyvanse; tried 2020 to taper off, but could not;    ASCUS with positive high risk HPV cervical 3/20/2019    1993 abnormal, had colposcopy  1998 NIL 6/10/09 ASCUS, neg HPV 6/14/12 NIL 3/20/19 ASCUS, +HR HPV (not 16/18) 8/7/19 NIL pap, +HR HPV (not 16/18) 4/27/21 NIL pap, neg HPV. Plan: cotest in 1 year    ASCUS with positive high risk HPV cervical 3/20/2019    1993 abnormal, had colposcopy  1998 NIL 6/10/09 ASCUS, neg HPV 6/14/12 NIL 3/20/19 ASCUS, +HR HPV (not 16/18) 8/7/19 NIL pap, +HR HPV (not 16/18) 4/27/21 NIL pap, neg HPV. Plan: cotest in 1 year    H/O mammogram 01/2019    BIRADS 2; do annual screen, next due 1/2020    H/O mammogram 01/2019    BIRADS 2; do annual screen, next due 1/2020    Herpes genitalis in women 1988    Herpes genitalis in women 1988    HPV in female 08/2019    NOT 16 or 18, but \"other\"; next pap due 7/2020    HPV in female 08/2019    NOT 16 or 18, but \"other\"; next pap due 7/2020    Lichen sclerosus 2018 5/2018 possibly first seen; at pap 3/20/19 no evidence of it    Lichen sclerosus 2018 5/2018 possibly first seen; at pap 3/20/19 no evidence of it    Trichotillomania     goes in phases, sometimes worse, sometimes better; as of 3/20/19 better; fluoxetine 40mg helps    Trichotillomania     goes in phases, sometimes worse, sometimes better; as of 3/20/19 better; fluoxetine 40mg helps      Past Surgical History:   Procedure Laterality Date    COLPOSCOPY W/ BIOPSY / CURETTAGE  " "     BREAST CYST ASPIRATION RIGHT Right 7/3/2019    [facility]  DELIVERY ONLY  ,     Description:  Section;  Recorded: 2009;  Comments: x 2 (for breech, repeat)     OB History    Para Term  AB Living   2 2 2 0 0 2   SAB IAB Ectopic Multiple Live Births   0 0 0 0 0      # Outcome Date GA Lbr Momo/2nd Weight Sex Delivery Anes PTL Lv   2 Term            1 Term                Review of Systems   Constitutional:  Negative for chills and fever.   HENT:  Positive for congestion. Negative for ear pain, hearing loss and sore throat.    Eyes:  Negative for pain and visual disturbance.   Respiratory:  Negative for cough and shortness of breath.    Cardiovascular:  Positive for palpitations. Negative for chest pain and peripheral edema.   Gastrointestinal:  Negative for abdominal pain, constipation, diarrhea, heartburn, hematochezia and nausea.   Genitourinary:  Negative for dysuria, frequency, genital sores, hematuria and urgency.   Musculoskeletal:  Positive for arthralgias. Negative for joint swelling and myalgias.   Skin:  Negative for rash.   Neurological:  Positive for dizziness and weakness. Negative for headaches and paresthesias.   Psychiatric/Behavioral:  Negative for mood changes. The patient is nervous/anxious.    Anal itching     OBJECTIVE:   /69   Pulse 84   Temp 98.1  F (36.7  C) (Oral)   Resp 16   Ht 1.67 m (5' 5.75\")   Wt 60.9 kg (134 lb 4 oz)   LMP  (LMP Unknown)   SpO2 98%   Breastfeeding No   BMI 21.83 kg/m    Physical Exam  GENERAL: healthy, alert and no distress  EYES: Eyes grossly normal to inspection, PERRL and conjunctivae and sclerae normal  NECK: no adenopathy, no asymmetry, masses, or scars and thyroid normal to palpation  RESP: lungs clear to auscultation - no rales, rhonchi or wheezes  CV: regular rate and rhythm, normal S1 S2, no S3 or S4, no murmur, click or rub, no peripheral edema and peripheral pulses strong  ABDOMEN: soft, nontender, no " hepatosplenomegaly, no masses and bowel sounds normal  : vulva normal, entroitus slightly pale with slight excoriation at the base of the vaginal opening. Cervix smooth and pink and moist with mucous; pap taken; bimanual exam reveals no enlargement of uterus or ovaries.  MS: no gross musculoskeletal defects noted, no edema  SKIN: no suspicious lesions or rashes  NEURO: Normal strength and tone, mentation intact and speech normal  PSYCH: mentation appears normal, affect normal/bright    Diagnostic Test Results:  Labs reviewed in Epic  none     ASSESSMENT/PLAN:   (Z00.00) Preventative health care  (primary encounter diagnosis)  Comment: doing fairly well. Really needs to resume exercise for all the benefits that come with it. For anal itching - use wet cloth to wipe after Bms. Also use OTC hemorrhoid cream.  Plan: REVIEW OF HEALTH MAINTENANCE PROTOCOL ORDERS    (Z12.31) Visit for screening mammogram  Comment: she will get this done. 3D mammo recommended due to dense tissue  Plan: MA SCREENING DIGITAL BILAT - Future  (s+30)    (R00.2) Palpitations  Comment: these have only recently appeared. She's had about 3 episodes in 6 months. We'll check and EKG today. If they increase to once per month, do more work up. I think exercise might help this - it is possible these come from long-standing mental illness.  Plan: EKG 12-lead, tracing only    (A60.00) Herpes simplex infection of genitourinary system  Comment: no recent outbreaks    (N87.9) Cervical Dysplasia  Comment: historical and the reason for more frequent paps. Await today's result to determine what to do next. Paps back 5 years have all been normal    (F90.0) Attention deficit hyperactivity disorder (ADHD), predominantly inattentive type  Comment: continue Vyvanse 20. It really helps her to make progress in her work (not just seem problems and get paralyzed)    (E55.9) Vitamin D Deficiency  Comment: she takes a supplement. Check vit D level next time we draw  blood  Another reason to exercise - for bone health    (F32.1) Current moderate episode of major depressive disorder, unspecified whether recurrent (H)  Comment: adequately treated with fluoxetine. Continue same    (R87.610,  R87.810) ASCUS with positive high risk HPV cervical  Comment: historical - await today's results.        COUNSELING:  Reviewed preventive health counseling, as reflected in patient instructions       Regular exercise       Vision screening       Immunizations  Recommended Vaccinated for: Zoster           Alcohol Use       Osteoporosis prevention/bone health       Advance Care Planning        She reports that she has never smoked. She has been exposed to tobacco smoke. She has never used smokeless tobacco.          Lalitha Rivera MD  [facility]

## 2023-08-16 LAB
BKR LAB AP GYN ADEQUACY: NORMAL
BKR LAB AP GYN INTERPRETATION: NORMAL
BKR LAB AP HPV REFLEX: NORMAL
BKR LAB AP PREVIOUS ABNORMAL: NORMAL
PATH REPORT.COMMENTS IMP SPEC: NORMAL
PATH REPORT.COMMENTS IMP SPEC: NORMAL
PATH REPORT.RELEVANT HX SPEC: NORMAL

## 2023-08-17 ENCOUNTER — ANCILLARY ORDERS (OUTPATIENT)
Dept: FAMILY MEDICINE | Facility: CLINIC | Age: 58
End: 2023-08-17

## 2023-08-17 ENCOUNTER — ANCILLARY PROCEDURE (OUTPATIENT)
Dept: MAMMOGRAPHY | Facility: CLINIC | Age: 58
End: 2023-08-17
Attending: FAMILY MEDICINE
Payer: COMMERCIAL

## 2023-08-17 DIAGNOSIS — R00.2 PALPITATIONS: ICD-10-CM

## 2023-08-17 DIAGNOSIS — F33.0 MILD RECURRENT MAJOR DEPRESSION: ICD-10-CM

## 2023-08-17 DIAGNOSIS — Z76.0 ENCOUNTER FOR MEDICATION REFILL: ICD-10-CM

## 2023-08-17 DIAGNOSIS — R87.610 ASCUS WITH POSITIVE HIGH RISK HPV CERVICAL: ICD-10-CM

## 2023-08-17 DIAGNOSIS — Z12.4 CERVICAL CANCER SCREENING: ICD-10-CM

## 2023-08-17 DIAGNOSIS — A60.00 HERPES SIMPLEX INFECTION OF GENITOURINARY SYSTEM: ICD-10-CM

## 2023-08-17 DIAGNOSIS — Z12.31 VISIT FOR SCREENING MAMMOGRAM: ICD-10-CM

## 2023-08-17 DIAGNOSIS — E55.9 VITAMIN D DEFICIENCY: ICD-10-CM

## 2023-08-17 DIAGNOSIS — N87.9 DYSPLASIA OF CERVIX: ICD-10-CM

## 2023-08-17 DIAGNOSIS — R87.810 ASCUS WITH POSITIVE HIGH RISK HPV CERVICAL: ICD-10-CM

## 2023-08-17 DIAGNOSIS — N89.8 VAGINAL DRYNESS: ICD-10-CM

## 2023-08-17 DIAGNOSIS — Z00.00 PREVENTATIVE HEALTH CARE: Primary | ICD-10-CM

## 2023-08-17 DIAGNOSIS — F90.0 ATTENTION DEFICIT HYPERACTIVITY DISORDER (ADHD), PREDOMINANTLY INATTENTIVE TYPE: ICD-10-CM

## 2023-08-17 DIAGNOSIS — F32.1 CURRENT MODERATE EPISODE OF MAJOR DEPRESSIVE DISORDER, UNSPECIFIED WHETHER RECURRENT (H): ICD-10-CM

## 2023-08-17 PROCEDURE — 77067 SCR MAMMO BI INCL CAD: CPT | Mod: TC | Performed by: RADIOLOGY

## 2023-08-17 PROCEDURE — 77063 BREAST TOMOSYNTHESIS BI: CPT | Mod: TC | Performed by: RADIOLOGY

## 2023-08-18 LAB
HUMAN PAPILLOMA VIRUS 16 DNA: NEGATIVE
HUMAN PAPILLOMA VIRUS 18 DNA: NEGATIVE
HUMAN PAPILLOMA VIRUS FINAL DIAGNOSIS: NORMAL
HUMAN PAPILLOMA VIRUS OTHER HR: NEGATIVE

## 2023-08-25 ENCOUNTER — TELEPHONE (OUTPATIENT)
Dept: FAMILY MEDICINE | Facility: CLINIC | Age: 58
End: 2023-08-25
Payer: COMMERCIAL

## 2023-08-25 NOTE — TELEPHONE ENCOUNTER
Also sending to the medical director.  Hi Dr. Rivera,  3rd request to review and advise.    Olivia Joseph, RN  8/21/2023  6:33 AM CDT       Hi Dr. Rivera,  2nd request to review and advise.    Olivia Jsoeph, RN  8/18/2023 10:16 AM CDT       Hi Dr. Rivera,  59 yo with a NIL Pap, Neg HR HPV. See her pap hx listed below. Would you like to recommend a cotest now in 3 years? Please advise.     1993 abnormal, had colposcopy  1998 NIL  6/10/09 ASCUS, neg HPV  6/14/12 NIL  3/20/19 ASCUS, +HR HPV (not 16/18)  8/7/19 NIL pap, +HR HPV (not 16/18)  4/27/21 NIL pap, neg HPV. Plan: cotest in 1 year  05/3/22 appt scheduled, notes added-- pap not done  07/21/22 Lost to follow-up for pap tracking, elsy routed to provider  08/11/23 NIL Pap, Neg HR HPV

## 2023-08-25 NOTE — TELEPHONE ENCOUNTER
"Copied and pasted per result note of the provider,    \"Agree with repeat PAP and HPV test in 3 years.   Dr Barba\"   "

## 2023-08-29 ENCOUNTER — MYC REFILL (OUTPATIENT)
Dept: FAMILY MEDICINE | Facility: CLINIC | Age: 58
End: 2023-08-29
Payer: COMMERCIAL

## 2023-08-29 DIAGNOSIS — F90.0 ATTENTION DEFICIT HYPERACTIVITY DISORDER (ADHD), PREDOMINANTLY INATTENTIVE TYPE: ICD-10-CM

## 2023-08-29 DIAGNOSIS — Z76.0 ENCOUNTER FOR MEDICATION REFILL: ICD-10-CM

## 2023-08-30 RX ORDER — LISDEXAMFETAMINE DIMESYLATE 20 MG/1
20 CAPSULE ORAL EVERY MORNING
Qty: 28 CAPSULE | Refills: 0 | OUTPATIENT
Start: 2023-08-30

## 2023-09-29 ENCOUNTER — MYC REFILL (OUTPATIENT)
Dept: FAMILY MEDICINE | Facility: CLINIC | Age: 58
End: 2023-09-29
Payer: COMMERCIAL

## 2023-09-29 DIAGNOSIS — F90.0 ATTENTION DEFICIT HYPERACTIVITY DISORDER (ADHD), PREDOMINANTLY INATTENTIVE TYPE: ICD-10-CM

## 2023-09-29 DIAGNOSIS — Z76.0 ENCOUNTER FOR MEDICATION REFILL: ICD-10-CM

## 2023-09-29 RX ORDER — LISDEXAMFETAMINE DIMESYLATE 20 MG/1
20 CAPSULE ORAL EVERY MORNING
Qty: 28 CAPSULE | Refills: 0 | Status: SHIPPED | OUTPATIENT
Start: 2023-09-29 | End: 2023-10-28

## 2023-10-28 ENCOUNTER — MYC REFILL (OUTPATIENT)
Dept: FAMILY MEDICINE | Facility: CLINIC | Age: 58
End: 2023-10-28
Payer: COMMERCIAL

## 2023-10-28 DIAGNOSIS — F90.0 ATTENTION DEFICIT HYPERACTIVITY DISORDER (ADHD), PREDOMINANTLY INATTENTIVE TYPE: ICD-10-CM

## 2023-10-28 DIAGNOSIS — F33.0 MILD RECURRENT MAJOR DEPRESSION (H): ICD-10-CM

## 2023-10-28 DIAGNOSIS — Z76.0 ENCOUNTER FOR MEDICATION REFILL: ICD-10-CM

## 2023-10-30 RX ORDER — FLUOXETINE 40 MG/1
40 CAPSULE ORAL DAILY
Qty: 90 CAPSULE | Refills: 3 | OUTPATIENT
Start: 2023-10-30

## 2023-10-31 RX ORDER — LISDEXAMFETAMINE DIMESYLATE 20 MG/1
20 CAPSULE ORAL EVERY MORNING
Qty: 28 CAPSULE | Refills: 0 | Status: SHIPPED | OUTPATIENT
Start: 2023-10-31 | End: 2023-11-27

## 2023-11-27 ENCOUNTER — MYC REFILL (OUTPATIENT)
Dept: FAMILY MEDICINE | Facility: CLINIC | Age: 58
End: 2023-11-27
Payer: COMMERCIAL

## 2023-11-27 DIAGNOSIS — F90.0 ATTENTION DEFICIT HYPERACTIVITY DISORDER (ADHD), PREDOMINANTLY INATTENTIVE TYPE: ICD-10-CM

## 2023-11-27 DIAGNOSIS — Z76.0 ENCOUNTER FOR MEDICATION REFILL: ICD-10-CM

## 2023-11-28 RX ORDER — LISDEXAMFETAMINE DIMESYLATE 20 MG/1
20 CAPSULE ORAL EVERY MORNING
Qty: 28 CAPSULE | Refills: 0 | Status: SHIPPED | OUTPATIENT
Start: 2023-11-28 | End: 2023-12-20

## 2023-12-04 ENCOUNTER — TELEPHONE (OUTPATIENT)
Dept: FAMILY MEDICINE | Facility: CLINIC | Age: 58
End: 2023-12-04
Payer: COMMERCIAL

## 2023-12-04 DIAGNOSIS — F90.0 ATTENTION DEFICIT HYPERACTIVITY DISORDER (ADHD), PREDOMINANTLY INATTENTIVE TYPE: ICD-10-CM

## 2023-12-04 DIAGNOSIS — Z76.0 ENCOUNTER FOR MEDICATION REFILL: ICD-10-CM

## 2023-12-04 DIAGNOSIS — F90.0 ATTENTION DEFICIT HYPERACTIVITY DISORDER (ADHD), PREDOMINANTLY INATTENTIVE TYPE: Primary | ICD-10-CM

## 2023-12-04 RX ORDER — LISDEXAMFETAMINE DIMESYLATE 20 MG/1
20 CAPSULE ORAL EVERY MORNING
Qty: 28 CAPSULE | Refills: 0 | Status: SHIPPED | OUTPATIENT
Start: 2023-12-04 | End: 2023-12-22

## 2023-12-04 NOTE — TELEPHONE ENCOUNTER
14  DAY STM VISIT    Diagnostic AHI: 79.2  PSG    Subjective measures:   Spoke with patients son and he stated his mom is doing well with Bi-Level.     Assessment: Pt  meeting objective benchmarks for compliance  Patient meeting subjective benchmarks.     Action plan: pt to have 30 day STM visit.      Device type: Bilevel    PAP settings: Bi-level    Mask type:  Full Face Mask    Objective measures: 14 day rolling measures      Compliance  100 %      Leak  24.96  lpm  last  upload      AHI 2.75   last  upload      Average number of minutes 589      Objective measure goal  Compliance   Goal >70%  Leak   Goal < 24 lpm  AHI  Goal < 5  Usage  Goal >240        Total time spent on accessing and interpreting remote patient PAP therapy data  10 minutes    Total time spent counseling, coaching  and reviewing PAP therapy data with patient  3 minutes    [id number]  [id number]  no (3 day STM)     Prior Authorization Retail Medication Request    Medication/Dose: lisdexamfetamine (VYVANSE) 20 MG capsule Take 1 capsule (20 mg) by mouth every morning - Oral   Diagnosis and ICD code (if different than what is on RX):  [F90.0].   New/renewal/insurance change PA/secondary ins. PA: Unspecified 2  Previously Tried and Failed:  unknown   Rationale:  unknown     Insurance   Primary:     [employer] ACCESS   Insurance ID:  [id number]

## 2023-12-04 NOTE — TELEPHONE ENCOUNTER
Medication Question or Refill    Contacts         Type Contact Phone/Fax    12/04/2023 01:34 PM CST Phone (Incoming) Kasia Quiroz (Self) [phone] (M)            What medication are you calling about (include dose and sig)?: lisdexamfetamine (VYVANSE) 20 MG capsule     Preferred Pharmacy:  [facility][id number] [place] [address] AT [facility] [address]  [address]  [facility] [address]  Phone: [phone] Fax: [phone]      Controlled Substance Agreement on file:   CSA -- Patient Level:     [Media Unavailable] Controlled Substance Agreement - Non - Opioid - Scan on 6/9/2021   [Media Unavailable] Controlled Substance Agreement - Non - Opioid - Scan on 2/19/2019   [Media Unavailable] Controlled Substance Agreement - Opioid - Scan on 5/14/2018: [facility]   [Media Unavailable] Controlled Substance Agreement - Opioid - Scan on 2/15/2017   [Media Unavailable] Controlled Substance Agreement - Opioid - Scan on 12/10/2015: [facility]       Who prescribed the medication?: Miguel    Do you need a refill? No    When did you use the medication last? N/A    Patient offered an appointment? No    Do you have any questions or concerns?  Yes: Patient received an order on medication listed above for the generic version of Vyvanse, but it needs a PA. Caller stated generic is out of stock at her pharmacy, but was wondering if she could get a Rx for the brand version of Vyvanse. Please advise       Could we send this information to you in [place] or would you prefer to receive a phone call?:   Patient would prefer a phone call   Okay to leave a detailed message?: Yes at Home number on file [phone] (home)

## 2023-12-04 NOTE — TELEPHONE ENCOUNTER
Rx sent for brand name    Not sure how long PA will take, and cost might be high. It might be better to check with other pharmacies to find generic - it is up to patient

## 2023-12-06 NOTE — TELEPHONE ENCOUNTER
Central Prior Authorization Team - Phone: [phone]     Hi this is a duplicate encounter. A Prior authorization has already been submitted for this medication. Please refer to other encounter for updates on PA. Thank you.

## 2023-12-06 NOTE — TELEPHONE ENCOUNTER
Pt was calling back again about this prior auth  She wants the prior auth to be submitted    This will be routed to the prior auth pool    Dianelys Dimas RN, BSN  [facility]

## 2023-12-06 NOTE — TELEPHONE ENCOUNTER
Generic is covered for up to two capsule per day without an authorization.  If patient requires brand please provide clinical rational as to why she can not take generic.  Chart notes will need to be submitted along with request that document failure or allergic reaction to generic.

## 2023-12-06 NOTE — TELEPHONE ENCOUNTER
Central Prior Authorization Team   Phone: [phone]    PA Initiation    Medication: Vyvanse 20MG capsules  Insurance Company: [employer] - Phone [phone] Fax [phone]  Pharmacy Filling the Rx: [id number] [place], [place] - [address] AT [address]  Filling Pharmacy Phone: [phone]  Filling Pharmacy Fax:    Start Date: 12/6/2023

## 2023-12-12 NOTE — TELEPHONE ENCOUNTER
Please call this patient and ask if she is getting her med or if she is out of it. Sounds like her insurance will only cover the generic form.

## 2023-12-19 ENCOUNTER — TELEPHONE (OUTPATIENT)
Dept: FAMILY MEDICINE | Facility: CLINIC | Age: 58
End: 2023-12-19
Payer: COMMERCIAL

## 2023-12-19 DIAGNOSIS — Z76.0 ENCOUNTER FOR MEDICATION REFILL: ICD-10-CM

## 2023-12-19 DIAGNOSIS — F90.0 ATTENTION DEFICIT HYPERACTIVITY DISORDER (ADHD), PREDOMINANTLY INATTENTIVE TYPE: ICD-10-CM

## 2023-12-19 NOTE — TELEPHONE ENCOUNTER
Please call Kasia Quiroz.    There is evidence on her chart that her lisdexamphetamine was refilled 11/28/23 and 12/4/23.  Does she have any of this med now? If so, who is the prescriber on the med bottle?    We want to provide what is needed but not extra and there seems to be a problem with frequent refill requests. Trying to clarify.

## 2023-12-19 NOTE — TELEPHONE ENCOUNTER
See the telephone message 12/4.  Patient states med was out of stock was given an emergency supply of 12  Prior auth of vyvanase was declined by insurance.  Will need new Rx   has 4 pills left

## 2023-12-20 RX ORDER — LISDEXAMFETAMINE DIMESYLATE 20 MG/1
20 CAPSULE ORAL EVERY MORNING
Qty: 28 CAPSULE | Refills: 0 | Status: SHIPPED | OUTPATIENT
Start: 2023-12-20 | End: 2023-12-22

## 2023-12-22 RX ORDER — LISDEXAMFETAMINE DIMESYLATE 20 MG/1
20 CAPSULE ORAL EVERY MORNING
Qty: 28 CAPSULE | Refills: 0 | Status: SHIPPED | OUTPATIENT
Start: 2023-12-22 | End: 2024-01-16

## 2023-12-22 NOTE — TELEPHONE ENCOUNTER
Refill sent to [facility] as requested.    PLEASE call the first pharmacy and CANCEL the order, letting them know the patient will get the med this month elsewhere.    Then call the patient and let her know the med has been ordered to [facility] (and find out if she wants to stick with [facility] ongoing)

## 2023-12-22 NOTE — TELEPHONE ENCOUNTER
Patient called state lisdexamfetamine (VYVANSE) 20 MG capsule was send to [facility] #[id number] - [place], [place] - [address] AT [facility] pharmacy is on back order. Patient requested for Dr. Rivera to send prescription to [id number] - [place], [place] - [address]

## 2023-12-26 NOTE — TELEPHONE ENCOUNTER
Author called [facility] - they will cancel Vyvanse prescription since patient will  at [facility].     Author called patient. She is aware that order was sent to [facility].

## 2024-01-16 ENCOUNTER — MYC REFILL (OUTPATIENT)
Dept: FAMILY MEDICINE | Facility: CLINIC | Age: 59
End: 2024-01-16
Payer: COMMERCIAL

## 2024-01-16 DIAGNOSIS — F90.0 ATTENTION DEFICIT HYPERACTIVITY DISORDER (ADHD), PREDOMINANTLY INATTENTIVE TYPE: ICD-10-CM

## 2024-01-16 DIAGNOSIS — Z76.0 ENCOUNTER FOR MEDICATION REFILL: ICD-10-CM

## 2024-01-16 RX ORDER — LISDEXAMFETAMINE DIMESYLATE 20 MG/1
20 CAPSULE ORAL EVERY MORNING
Qty: 28 CAPSULE | Refills: 0 | Status: SHIPPED | OUTPATIENT
Start: 2024-01-16 | End: 2024-02-11

## 2024-02-11 ENCOUNTER — MYC REFILL (OUTPATIENT)
Dept: FAMILY MEDICINE | Facility: CLINIC | Age: 59
End: 2024-02-11
Payer: COMMERCIAL

## 2024-02-11 DIAGNOSIS — F33.0 MILD RECURRENT MAJOR DEPRESSION (H): ICD-10-CM

## 2024-02-11 DIAGNOSIS — F90.0 ATTENTION DEFICIT HYPERACTIVITY DISORDER (ADHD), PREDOMINANTLY INATTENTIVE TYPE: ICD-10-CM

## 2024-02-11 DIAGNOSIS — Z76.0 ENCOUNTER FOR MEDICATION REFILL: ICD-10-CM

## 2024-02-11 RX ORDER — FLUOXETINE 40 MG/1
40 CAPSULE ORAL DAILY
Qty: 90 CAPSULE | Refills: 3 | Status: CANCELLED | OUTPATIENT
Start: 2024-02-11

## 2024-02-13 RX ORDER — LISDEXAMFETAMINE DIMESYLATE 20 MG/1
20 CAPSULE ORAL EVERY MORNING
Qty: 28 CAPSULE | Refills: 0 | Status: SHIPPED | OUTPATIENT
Start: 2024-02-13 | End: 2024-03-25

## 2024-03-25 ENCOUNTER — MYC REFILL (OUTPATIENT)
Dept: FAMILY MEDICINE | Facility: CLINIC | Age: 59
End: 2024-03-25
Payer: COMMERCIAL

## 2024-03-25 DIAGNOSIS — F90.0 ATTENTION DEFICIT HYPERACTIVITY DISORDER (ADHD), PREDOMINANTLY INATTENTIVE TYPE: ICD-10-CM

## 2024-03-25 DIAGNOSIS — Z76.0 ENCOUNTER FOR MEDICATION REFILL: ICD-10-CM

## 2024-03-26 RX ORDER — LISDEXAMFETAMINE DIMESYLATE 20 MG/1
20 CAPSULE ORAL EVERY MORNING
Qty: 28 CAPSULE | Refills: 0 | Status: SHIPPED | OUTPATIENT
Start: 2024-03-26 | End: 2024-04-21

## 2024-04-21 ENCOUNTER — MYC REFILL (OUTPATIENT)
Dept: FAMILY MEDICINE | Facility: CLINIC | Age: 59
End: 2024-04-21
Payer: COMMERCIAL

## 2024-04-21 DIAGNOSIS — F90.0 ATTENTION DEFICIT HYPERACTIVITY DISORDER (ADHD), PREDOMINANTLY INATTENTIVE TYPE: ICD-10-CM

## 2024-04-21 DIAGNOSIS — Z76.0 ENCOUNTER FOR MEDICATION REFILL: ICD-10-CM

## 2024-04-22 RX ORDER — LISDEXAMFETAMINE DIMESYLATE 20 MG/1
20 CAPSULE ORAL EVERY MORNING
Qty: 28 CAPSULE | Refills: 0 | Status: SHIPPED | OUTPATIENT
Start: 2024-04-22 | End: 2024-05-20

## 2024-05-16 ENCOUNTER — OFFICE VISIT (OUTPATIENT)
Dept: FAMILY MEDICINE | Facility: CLINIC | Age: 59
End: 2024-05-16
Payer: COMMERCIAL

## 2024-05-16 VITALS
BODY MASS INDEX: 22.5 KG/M2 | RESPIRATION RATE: 16 BRPM | DIASTOLIC BLOOD PRESSURE: 81 MMHG | TEMPERATURE: 97.7 F | HEART RATE: 78 BPM | WEIGHT: 140 LBS | OXYGEN SATURATION: 100 % | SYSTOLIC BLOOD PRESSURE: 122 MMHG | HEIGHT: 66 IN

## 2024-05-16 DIAGNOSIS — J32.9 CHRONIC SINUSITIS, UNSPECIFIED LOCATION: Primary | ICD-10-CM

## 2024-05-16 PROCEDURE — 90715 TDAP VACCINE 7 YRS/> IM: CPT | Performed by: PHYSICIAN ASSISTANT

## 2024-05-16 PROCEDURE — 90750 HZV VACC RECOMBINANT IM: CPT | Performed by: PHYSICIAN ASSISTANT

## 2024-05-16 PROCEDURE — 99213 OFFICE O/P EST LOW 20 MIN: CPT | Mod: 25 | Performed by: PHYSICIAN ASSISTANT

## 2024-05-16 PROCEDURE — 90472 IMMUNIZATION ADMIN EACH ADD: CPT | Performed by: PHYSICIAN ASSISTANT

## 2024-05-16 PROCEDURE — 90471 IMMUNIZATION ADMIN: CPT | Performed by: PHYSICIAN ASSISTANT

## 2024-05-16 ASSESSMENT — PATIENT HEALTH QUESTIONNAIRE - PHQ9
SUM OF ALL RESPONSES TO PHQ QUESTIONS 1-9: 0
SUM OF ALL RESPONSES TO PHQ QUESTIONS 1-9: 0
10. IF YOU CHECKED OFF ANY PROBLEMS, HOW DIFFICULT HAVE THESE PROBLEMS MADE IT FOR YOU TO DO YOUR WORK, TAKE CARE OF THINGS AT HOME, OR GET ALONG WITH OTHER PEOPLE: NOT DIFFICULT AT ALL

## 2024-05-16 NOTE — PROGRESS NOTES
Subjective:    Kasia Quiroz is a 59 year old female who presents with chief complaint of sinus issues  For the past several months she has had chronic sinus issues.  Primarily on the left side of her face.  She says she always feels like the left side of her nose and sinuses are stuffed up and congested.  Feels like at night sinuses do not drain.  Also feels like she gets snot or fluid on the left side and that it drips down the back of her throat too.  History of seasonal and environmental allergies.  She is taking oral allergy medicine.  She is also using steroid nasal spray regularly along with her saline nasal spray.  They only helped minimally.  She says current symptoms feel different than when she has had bad allergies in the past.  Non-smoker.  No cough, no fever.  No recent antibiotics.  She did also try Breathe Right strips and those helped minimally.  Has not had a lot of issues with sinus infections in the past.  She notes her daughter just had sinus surgery.    Reason for visit:  Sinus  Symptom onset:  More than a month  Symptoms include:  Very thick post nasal drip and stuffiness on left side  Symptom intensity:  Moderate  Symptom progression:  Staying the same  Had these symptoms before:  No  What makes it worse:  No  What makes it better:  No     Acute illness concerns: Sinus issues  Onset/Duration: x2 months  Symptoms:  Fever: No  Chills/Sweats: YES  Headache (location?): No  Sinus Pressure: YES  Conjunctivitis:  No  Ear Pain: no  Rhinorrhea: YES  Congestion: YES  Sore Throat: No  Cough: YES-productive of yellow sputum  Wheeze: No  Decreased Appetite: No  Nausea: No  Vomiting: No  Diarrhea: No  Dysuria/Freq.: No  Dysuria or Hematuria: No  Fatigue/Achiness: No  Sick/Strep Exposure: No  Therapies tried and outcome: Steroid nasal spray, saline nasal spray, OTC allergy medication, increased water intake      Patient Active Problem List   Diagnosis    Herpes Simplex Type II    Cervical Dysplasia    ADHD,  "Predominantly Inattentive Type    Vitamin D Deficiency    Allergic rhinitis    Controlled substance agreement signed    Current moderate episode of major depressive disorder, unspecified whether recurrent (H)    Hyperthyroidism    ASCUS with positive high risk HPV cervical       Current Outpatient Medications:     FLUoxetine (PROZAC) 40 MG capsule, Take 1 capsule (40 mg) by mouth daily, Disp: 90 capsule, Rfl: 3    lisdexamfetamine (VYVANSE) 20 MG capsule, Take 1 capsule (20 mg) by mouth every morning, Disp: 28 capsule, Rfl: 0      Objective:   Allergies:  Cat dander [animal dander], Cat hair extract, Dog dander [dog epithelium (canis lupus familiaris)], and Other environmental allergy    Vitals:  Vitals:    05/16/24 1118   BP: 122/81   BP Location: Left arm   Patient Position: Sitting   Cuff Size: Adult Regular   Pulse: 78   Resp: 16   Temp: 97.7  F (36.5  C)   TempSrc: Tympanic   SpO2: 100%   Weight: 63.5 kg (140 lb)   Height: 1.67 m (5' 5.75\")       Body mass index is 22.77 kg/m .    Vital signs reviewed.  General: Patient is alert and oriented x 3, in no apparent distress  Ears: TMs are non-erythematous with good light reflex bilaterally  Nose: Possible enlarged nasal turbinates, left side more than right  Throat: no erythema, edema or exudate noted  Lymphatic: no anterior cervical lymph node enlargement  Cardiac: regular rate and rhythm, no murmurs  Pulmonary: lungs clear to auscultation bilaterally, no crackles, rales, rhonchi, or wheezing noted        Assessment and Plan:   1. Chronic sinusitis, unspecified location  Chronic problem, not improving.  Has not seen ENT before.  I would recommend that.  Referral placed today.  She will continue with all of her other symptomatic treatments.  Symptoms and exam did not really point to an active infection today, therefore I did not prescribe her antibiotics.  She is comfortable with this plan.  - Adult ENT  Referral; Future       This dictation uses voice " recognition software, which may contain typographical errors.

## 2024-05-20 ENCOUNTER — MYC REFILL (OUTPATIENT)
Dept: FAMILY MEDICINE | Facility: CLINIC | Age: 59
End: 2024-05-20
Payer: COMMERCIAL

## 2024-05-20 DIAGNOSIS — F90.0 ATTENTION DEFICIT HYPERACTIVITY DISORDER (ADHD), PREDOMINANTLY INATTENTIVE TYPE: ICD-10-CM

## 2024-05-20 DIAGNOSIS — F33.0 MILD RECURRENT MAJOR DEPRESSION (H): ICD-10-CM

## 2024-05-20 DIAGNOSIS — Z76.0 ENCOUNTER FOR MEDICATION REFILL: ICD-10-CM

## 2024-05-20 RX ORDER — FLUOXETINE 40 MG/1
40 CAPSULE ORAL DAILY
Qty: 90 CAPSULE | Refills: 3 | OUTPATIENT
Start: 2024-05-20

## 2024-05-20 RX ORDER — LISDEXAMFETAMINE DIMESYLATE 20 MG/1
20 CAPSULE ORAL EVERY MORNING
Qty: 28 CAPSULE | Refills: 0 | Status: SHIPPED | OUTPATIENT
Start: 2024-05-20 | End: 2024-06-19

## 2024-06-19 ENCOUNTER — MYC REFILL (OUTPATIENT)
Dept: FAMILY MEDICINE | Facility: CLINIC | Age: 59
End: 2024-06-19
Payer: COMMERCIAL

## 2024-06-19 DIAGNOSIS — Z76.0 ENCOUNTER FOR MEDICATION REFILL: ICD-10-CM

## 2024-06-19 DIAGNOSIS — F90.0 ATTENTION DEFICIT HYPERACTIVITY DISORDER (ADHD), PREDOMINANTLY INATTENTIVE TYPE: ICD-10-CM

## 2024-06-19 RX ORDER — LISDEXAMFETAMINE DIMESYLATE 20 MG/1
20 CAPSULE ORAL EVERY MORNING
Qty: 28 CAPSULE | Refills: 0 | Status: SHIPPED | OUTPATIENT
Start: 2024-06-19 | End: 2024-07-30

## 2024-07-08 ENCOUNTER — OFFICE VISIT (OUTPATIENT)
Dept: URGENT CARE | Facility: URGENT CARE | Age: 59
End: 2024-07-08
Payer: COMMERCIAL

## 2024-07-08 VITALS
RESPIRATION RATE: 18 BRPM | WEIGHT: 140 LBS | HEART RATE: 79 BPM | TEMPERATURE: 97.9 F | SYSTOLIC BLOOD PRESSURE: 121 MMHG | BODY MASS INDEX: 22.77 KG/M2 | OXYGEN SATURATION: 99 % | DIASTOLIC BLOOD PRESSURE: 76 MMHG

## 2024-07-08 DIAGNOSIS — R14.1 FLATULENCE, ERUCTATION AND GAS PAIN: ICD-10-CM

## 2024-07-08 DIAGNOSIS — R10.9 STOMACH ACHE: Primary | ICD-10-CM

## 2024-07-08 DIAGNOSIS — R35.0 INCREASED URINARY FREQUENCY: ICD-10-CM

## 2024-07-08 DIAGNOSIS — N23 URINARY TRACT PAIN: ICD-10-CM

## 2024-07-08 DIAGNOSIS — R14.3 FLATULENCE, ERUCTATION AND GAS PAIN: ICD-10-CM

## 2024-07-08 DIAGNOSIS — R14.2 FLATULENCE, ERUCTATION AND GAS PAIN: ICD-10-CM

## 2024-07-08 LAB
ALBUMIN UR-MCNC: NEGATIVE MG/DL
APPEARANCE UR: CLEAR
BILIRUB UR QL STRIP: NEGATIVE
COLOR UR AUTO: YELLOW
GLUCOSE UR STRIP-MCNC: NEGATIVE MG/DL
HGB UR QL STRIP: NEGATIVE
KETONES UR STRIP-MCNC: NEGATIVE MG/DL
LEUKOCYTE ESTERASE UR QL STRIP: NEGATIVE
NITRATE UR QL: NEGATIVE
PH UR STRIP: 7 [PH] (ref 5–7)
SP GR UR STRIP: 1.01 (ref 1–1.03)
UROBILINOGEN UR STRIP-ACNC: 0.2 E.U./DL

## 2024-07-08 PROCEDURE — 99213 OFFICE O/P EST LOW 20 MIN: CPT | Performed by: STUDENT IN AN ORGANIZED HEALTH CARE EDUCATION/TRAINING PROGRAM

## 2024-07-08 PROCEDURE — 83013 H PYLORI (C-13) BREATH: CPT | Mod: 90 | Performed by: STUDENT IN AN ORGANIZED HEALTH CARE EDUCATION/TRAINING PROGRAM

## 2024-07-08 PROCEDURE — 81003 URINALYSIS AUTO W/O SCOPE: CPT | Performed by: STUDENT IN AN ORGANIZED HEALTH CARE EDUCATION/TRAINING PROGRAM

## 2024-07-08 PROCEDURE — 99000 SPECIMEN HANDLING OFFICE-LAB: CPT | Performed by: STUDENT IN AN ORGANIZED HEALTH CARE EDUCATION/TRAINING PROGRAM

## 2024-07-08 RX ORDER — FAMOTIDINE 20 MG/1
20 TABLET, FILM COATED ORAL 2 TIMES DAILY PRN
Qty: 60 TABLET | Refills: 0 | Status: SHIPPED | OUTPATIENT
Start: 2024-07-08

## 2024-07-08 ASSESSMENT — PAIN SCALES - GENERAL: PAINLEVEL: MODERATE PAIN (5)

## 2024-07-08 NOTE — PROGRESS NOTES
"chief complaint: Concern for UTI    HPI:  Kasia Quiroz is a 59 year old female who presents today complaining of increased urinary frequency, and lower belly and  back cramps for 2 days now. No fever, or flank pain reported. Had some nausea earlier today. No vaginal itch ir abnormal.  Has continuous abd cramp that makes her belly feel gassy. Feels a little bloated as well. Denies constipation. No unsual weight loss.     History obtained from the patient.    Problem List:  2021-04: Hyperthyroidism  2021-04: Depression  2021-01: Current moderate episode of major depressive disorder,   unspecified whether recurrent (H)  2019-03: ASCUS with positive high risk HPV cervical  2015-12: Controlled substance agreement signed  2015-05: Allergic rhinitis  Herpes Simplex Type II  Cervical Dysplasia  ADHD, Predominantly Inattentive Type  Vitamin D Deficiency  Mild Recurrent Major Depression      Past Medical History:   Diagnosis Date    Abnormal Pap smear of cervix 03/2019    ASCUS with \"other\" HPV positive; repeat later 2019    Abnormal Pap smear of cervix 03/2019    ASCUS with \"other\" HPV positive; repeat later 2019    ADHD 2007    on Vyvanse; tried 2020 to taper off, but could not;    ADHD 2007    on Vyvanse; tried 2020 to taper off, but could not;    ASCUS with positive high risk HPV cervical 3/20/2019    1993 abnormal, had colposcopy  1998 NIL 6/10/09 ASCUS, neg HPV 6/14/12 NIL 3/20/19 ASCUS, +HR HPV (not 16/18) 8/7/19 NIL pap, +HR HPV (not 16/18) 4/27/21 NIL pap, neg HPV. Plan: cotest in 1 year    ASCUS with positive high risk HPV cervical 3/20/2019    1993 abnormal, had colposcopy  1998 NIL 6/10/09 ASCUS, neg HPV 6/14/12 NIL 3/20/19 ASCUS, +HR HPV (not 16/18) 8/7/19 NIL pap, +HR HPV (not 16/18) 4/27/21 NIL pap, neg HPV. Plan: cotest in 1 year    H/O mammogram 01/2019    BIRADS 2; do annual screen, next due 1/2020    H/O mammogram 01/2019    BIRADS 2; do annual screen, next due 1/2020    Herpes genitalis in women 1988    " "Herpes genitalis in women 1988    HPV in female 08/2019    NOT 16 or 18, but \"other\"; next pap due 7/2020    HPV in female 08/2019    NOT 16 or 18, but \"other\"; next pap due 7/2020    Lichen sclerosus 2018 5/2018 possibly first seen; at pap 3/20/19 no evidence of it    Lichen sclerosus 2018 5/2018 possibly first seen; at pap 3/20/19 no evidence of it    Trichotillomania     goes in phases, sometimes worse, sometimes better; as of 3/20/19 better; fluoxetine 40mg helps    Trichotillomania     goes in phases, sometimes worse, sometimes better; as of 3/20/19 better; fluoxetine 40mg helps       Social History     Tobacco Use    Smoking status: Never     Passive exposure: Yes    Smokeless tobacco: Never    Tobacco comments:     passive and on/off in college   Substance Use Topics    Alcohol use: Yes     Comment: Alcoholic Drinks/day: family history of alcoholism       Review of systems  ROS negative except for pertinent positives listed in HPI      Vitals:    07/08/24 1454   BP: 121/76   BP Location: Left arm   Patient Position: Sitting   Cuff Size: Adult Regular   Pulse: 79   Resp: 18   Temp: 97.9  F (36.6  C)   TempSrc: Oral   SpO2: 99%   Weight: 63.5 kg (140 lb)       Physical Exam  Constitutional: healthy, alert, and no distress  Head: Normocephalic.   Neck: Neck supple. No adenopathy. T  Respiratory:unlabored respiratory effort  Gastrointestinal: Abdomen soft, non-tender. BS normal. No masses, organomegaly  Musculoskeletal: extremities normal- no gross deformities noted, gait normal  Psychiatric: mentation appears normal and affect normal/bright      Assessment & Plan     Kasia was seen today for uti.    Diagnoses and all orders for this visit:    Flatulence, eructation and gas pain  Stomach ache  Gassiness  Concern for increased urinary frequency  Patient's urinalysis is unremarkable.  Though her current symptoms feels like the same as when she had UTI, I do not think that patient currently has an ongoing " infection.  However, patient did mention some nonspecific stomach cramps, gassiness, flatulence and belching a lot, has symptoms very concerning for possible H. pylori infection.  Could also be some dyspepsia patient did have nausea 1 time today which has now resolved, could also consider possible gastritis.  Could be constipation but patient is having BM every day.  Discussed obtaining H. pylori test today and considering use of famotidine to see if this helps with symptoms.  Patient will continue to use simethicone at home for the gas symptoms.  Encourage patient to follow-up with PCP as soon as she can.  Patient is planning to travel by tomorrow, and does not have any means of going the patient if she is out of the country.  Patient is planning to reach out to her PCP for her H. pylori test result follow-up.        -     UA Macroscopic with reflex to Microscopic and Culture  -     Helicobacter pylori Breath Test; Future  -     famotidine (PEPCID) 20 MG tablet; Take 1 tablet (20 mg) by mouth 2 times daily as needed (stomach upset)    At the end of the encounter, I discussed results, diagnosis, medications. Discussed red flags for immediate return to clinic/ER, as well as indications for follow up if no improvement. Patient understood and agreed to plan. Patient was stable for discharge.

## 2024-07-10 LAB — UREA BREATH TEST QL: NEGATIVE

## 2024-07-12 ENCOUNTER — PATIENT OUTREACH (OUTPATIENT)
Dept: CARE COORDINATION | Facility: CLINIC | Age: 59
End: 2024-07-12
Payer: COMMERCIAL

## 2024-07-26 ENCOUNTER — PATIENT OUTREACH (OUTPATIENT)
Dept: CARE COORDINATION | Facility: CLINIC | Age: 59
End: 2024-07-26
Payer: COMMERCIAL

## 2024-07-30 ENCOUNTER — MYC REFILL (OUTPATIENT)
Dept: FAMILY MEDICINE | Facility: CLINIC | Age: 59
End: 2024-07-30
Payer: COMMERCIAL

## 2024-07-30 DIAGNOSIS — F90.0 ATTENTION DEFICIT HYPERACTIVITY DISORDER (ADHD), PREDOMINANTLY INATTENTIVE TYPE: ICD-10-CM

## 2024-07-30 DIAGNOSIS — Z76.0 ENCOUNTER FOR MEDICATION REFILL: ICD-10-CM

## 2024-07-30 RX ORDER — LISDEXAMFETAMINE DIMESYLATE 20 MG/1
20 CAPSULE ORAL EVERY MORNING
Qty: 28 CAPSULE | Refills: 0 | Status: SHIPPED | OUTPATIENT
Start: 2024-07-30 | End: 2024-08-31

## 2024-08-19 DIAGNOSIS — Z76.0 ENCOUNTER FOR MEDICATION REFILL: ICD-10-CM

## 2024-08-19 DIAGNOSIS — F90.0 ATTENTION DEFICIT HYPERACTIVITY DISORDER (ADHD), PREDOMINANTLY INATTENTIVE TYPE: ICD-10-CM

## 2024-08-19 DIAGNOSIS — F33.0 MILD RECURRENT MAJOR DEPRESSION (H): ICD-10-CM

## 2024-08-20 RX ORDER — FLUOXETINE 40 MG/1
40 CAPSULE ORAL DAILY
Qty: 90 CAPSULE | Refills: 3 | Status: SHIPPED | OUTPATIENT
Start: 2024-08-20

## 2024-08-31 ENCOUNTER — MYC REFILL (OUTPATIENT)
Dept: FAMILY MEDICINE | Facility: CLINIC | Age: 59
End: 2024-08-31
Payer: COMMERCIAL

## 2024-08-31 DIAGNOSIS — Z76.0 ENCOUNTER FOR MEDICATION REFILL: ICD-10-CM

## 2024-08-31 DIAGNOSIS — F90.0 ATTENTION DEFICIT HYPERACTIVITY DISORDER (ADHD), PREDOMINANTLY INATTENTIVE TYPE: ICD-10-CM

## 2024-09-03 RX ORDER — LISDEXAMFETAMINE DIMESYLATE 20 MG/1
20 CAPSULE ORAL EVERY MORNING
Qty: 28 CAPSULE | Refills: 0 | Status: SHIPPED | OUTPATIENT
Start: 2024-09-03 | End: 2024-10-03

## 2024-09-15 ENCOUNTER — MYC REFILL (OUTPATIENT)
Dept: FAMILY MEDICINE | Facility: CLINIC | Age: 59
End: 2024-09-15
Payer: COMMERCIAL

## 2024-09-15 DIAGNOSIS — Z76.0 ENCOUNTER FOR MEDICATION REFILL: ICD-10-CM

## 2024-09-15 DIAGNOSIS — F33.0 MILD RECURRENT MAJOR DEPRESSION (H): ICD-10-CM

## 2024-09-15 DIAGNOSIS — F90.0 ATTENTION DEFICIT HYPERACTIVITY DISORDER (ADHD), PREDOMINANTLY INATTENTIVE TYPE: ICD-10-CM

## 2024-09-16 RX ORDER — FLUOXETINE 40 MG/1
40 CAPSULE ORAL DAILY
Qty: 90 CAPSULE | Refills: 3 | OUTPATIENT
Start: 2024-09-16

## 2024-09-17 ENCOUNTER — MYC MEDICAL ADVICE (OUTPATIENT)
Dept: FAMILY MEDICINE | Facility: CLINIC | Age: 59
End: 2024-09-17
Payer: COMMERCIAL

## 2024-09-18 NOTE — TELEPHONE ENCOUNTER
Called patient in response to [email or web address] message regarding post menopausal symptoms. Patient verbalized provider diagnosed patient with cervical dysplasia.  Patient currently undergoing menopause symptoms as well.  Request an appt with pcp.  Future appointment scheduled with date, time and location provided.    Glenn Haywood RN  [facility]

## 2024-09-25 ENCOUNTER — OFFICE VISIT (OUTPATIENT)
Dept: FAMILY MEDICINE | Facility: CLINIC | Age: 59
End: 2024-09-25
Payer: COMMERCIAL

## 2024-09-25 VITALS
TEMPERATURE: 98 F | DIASTOLIC BLOOD PRESSURE: 70 MMHG | SYSTOLIC BLOOD PRESSURE: 112 MMHG | HEIGHT: 66 IN | BODY MASS INDEX: 22.34 KG/M2 | OXYGEN SATURATION: 99 % | WEIGHT: 139 LBS | HEART RATE: 93 BPM | RESPIRATION RATE: 16 BRPM

## 2024-09-25 DIAGNOSIS — N89.8 VAGINAL DRYNESS: ICD-10-CM

## 2024-09-25 DIAGNOSIS — A60.00 HERPES SIMPLEX INFECTION OF GENITOURINARY SYSTEM: ICD-10-CM

## 2024-09-25 DIAGNOSIS — F41.9 ANXIETY AND DEPRESSION: ICD-10-CM

## 2024-09-25 DIAGNOSIS — F63.3 TRICHOTILLOMANIA: ICD-10-CM

## 2024-09-25 DIAGNOSIS — F32.1 CURRENT MODERATE EPISODE OF MAJOR DEPRESSIVE DISORDER, UNSPECIFIED WHETHER RECURRENT (H): ICD-10-CM

## 2024-09-25 DIAGNOSIS — N90.9 LESION OF FEMALE PERINEUM: Primary | ICD-10-CM

## 2024-09-25 DIAGNOSIS — F32.A ANXIETY AND DEPRESSION: ICD-10-CM

## 2024-09-25 DIAGNOSIS — Z00.00 PREVENTATIVE HEALTH CARE: ICD-10-CM

## 2024-09-25 DIAGNOSIS — R60.0 BILATERAL LEG EDEMA: ICD-10-CM

## 2024-09-25 LAB
ALBUMIN SERPL BCG-MCNC: 4.6 G/DL (ref 3.5–5.2)
ALP SERPL-CCNC: 67 U/L (ref 40–150)
ALT SERPL W P-5'-P-CCNC: 13 U/L (ref 0–50)
ANION GAP SERPL CALCULATED.3IONS-SCNC: 11 MMOL/L (ref 7–15)
AST SERPL W P-5'-P-CCNC: 22 U/L (ref 0–45)
BASOPHILS # BLD AUTO: 0 10E3/UL (ref 0–0.2)
BASOPHILS NFR BLD AUTO: 1 %
BILIRUB SERPL-MCNC: 0.4 MG/DL
BUN SERPL-MCNC: 20 MG/DL (ref 6–20)
CALCIUM SERPL-MCNC: 9.7 MG/DL (ref 8.8–10.4)
CHLORIDE SERPL-SCNC: 102 MMOL/L (ref 98–107)
CHOLEST SERPL-MCNC: 249 MG/DL
CREAT SERPL-MCNC: 0.87 MG/DL (ref 0.51–0.95)
EGFRCR SERPLBLD CKD-EPI 2021: 77 ML/MIN/1.73M2
EOSINOPHIL # BLD AUTO: 0.3 10E3/UL (ref 0–0.7)
EOSINOPHIL NFR BLD AUTO: 7 %
ERYTHROCYTE [DISTWIDTH] IN BLOOD BY AUTOMATED COUNT: 13 % (ref 10–15)
FASTING STATUS PATIENT QL REPORTED: NO
FASTING STATUS PATIENT QL REPORTED: NO
GLUCOSE SERPL-MCNC: 86 MG/DL (ref 70–99)
HCO3 SERPL-SCNC: 26 MMOL/L (ref 22–29)
HCT VFR BLD AUTO: 44.4 % (ref 35–47)
HDLC SERPL-MCNC: 99 MG/DL
HGB BLD-MCNC: 14.9 G/DL (ref 11.7–15.7)
IMM GRANULOCYTES # BLD: 0 10E3/UL
IMM GRANULOCYTES NFR BLD: 0 %
LDLC SERPL CALC-MCNC: 136 MG/DL
LYMPHOCYTES # BLD AUTO: 1.2 10E3/UL (ref 0.8–5.3)
LYMPHOCYTES NFR BLD AUTO: 28 %
MCH RBC QN AUTO: 31 PG (ref 26.5–33)
MCHC RBC AUTO-ENTMCNC: 33.6 G/DL (ref 31.5–36.5)
MCV RBC AUTO: 92 FL (ref 78–100)
MONOCYTES # BLD AUTO: 0.4 10E3/UL (ref 0–1.3)
MONOCYTES NFR BLD AUTO: 10 %
NEUTROPHILS # BLD AUTO: 2.3 10E3/UL (ref 1.6–8.3)
NEUTROPHILS NFR BLD AUTO: 54 %
NONHDLC SERPL-MCNC: 150 MG/DL
PLATELET # BLD AUTO: 223 10E3/UL (ref 150–450)
POTASSIUM SERPL-SCNC: 5 MMOL/L (ref 3.4–5.3)
PROT SERPL-MCNC: 7.2 G/DL (ref 6.4–8.3)
RBC # BLD AUTO: 4.81 10E6/UL (ref 3.8–5.2)
SODIUM SERPL-SCNC: 139 MMOL/L (ref 135–145)
TRIGL SERPL-MCNC: 72 MG/DL
TSH SERPL DL<=0.005 MIU/L-ACNC: 1.51 UIU/ML (ref 0.3–4.2)
WBC # BLD AUTO: 4.2 10E3/UL (ref 4–11)

## 2024-09-25 PROCEDURE — 80053 COMPREHEN METABOLIC PANEL: CPT | Performed by: FAMILY MEDICINE

## 2024-09-25 PROCEDURE — 36415 COLL VENOUS BLD VENIPUNCTURE: CPT | Performed by: FAMILY MEDICINE

## 2024-09-25 PROCEDURE — 85025 COMPLETE CBC W/AUTO DIFF WBC: CPT | Performed by: FAMILY MEDICINE

## 2024-09-25 PROCEDURE — 84443 ASSAY THYROID STIM HORMONE: CPT | Performed by: FAMILY MEDICINE

## 2024-09-25 PROCEDURE — 80061 LIPID PANEL: CPT | Performed by: FAMILY MEDICINE

## 2024-09-25 PROCEDURE — 99215 OFFICE O/P EST HI 40 MIN: CPT | Performed by: FAMILY MEDICINE

## 2024-09-25 NOTE — PROGRESS NOTES
Assessment & Plan     Lesion of female perineum  Right labia minora is slightly swollen - but she washed herself a lot this AM in preparation for the appointment - and that might have CAUSED this, not helped it.  There is a second spot like an ulcer but without any erythema inside the right labia minora.  I would like to re-examine these without her cleansing herself (harshly) prior to exam. However, I also want her to see an OBGYN for these, so I will refer her and I asked her to have NOTHING BUT WATER touch her perineum and anus between today and her OBGYN appt. She was shocked at my request but agreed to do it.  Referred to [facility]    Trichotillomania  She pulls pubic hairs at times - and has pulled many at this time.    Vaginal dryness  She has this and might be helped by estrogen, however, I think she needs to be examined again after NO disruption of the perineum.    Preventative health care  reviewed  - REVIEW OF HEALTH MAINTENANCE PROTOCOL ORDERS  - CBC with platelets and differential  - Comprehensive metabolic panel (BMP + Alb, Alk Phos, ALT, AST, Total. Bili, TP)  - Lipid panel reflex to direct LDL Non-fasting  - TSH with free T4 reflex  - CBC with platelets and differential  - Comprehensive metabolic panel (BMP + Alb, Alk Phos, ALT, AST, Total. Bili, TP)  - Lipid panel reflex to direct LDL Non-fasting  - TSH with free T4 reflex    Herpes simplex infection of genitourinary system  Noted - but I do not think what I saw was herpes because there was no erythema around the ulcer    Current moderate episode of major depressive disorder, unspecified whether recurrent (H)  She has depression AND anxiety and her anxiety was very, very high today - speech pressured, rapid thoughts, many questions, etc.    Bilateral leg edema  She reports having had lower Leg edema, but it is not present on exam today. I suggested she gets and try compression stockings    On the day of service, I spent 40 minutes with  "the patient, preparing for the visit with chart review, and charting.                  Subjective   Kasia is a 59 year old, presenting for the following health issues:  Other (Postmenopause symptoms- weight gain, dry skin and vaginal dryness, feels like its flaring up and wanting meds to treat it  /)        9/25/2024    10:02 AM   Additional Questions   Roomed by Maria Eugenia SCHNEIDER     History of Present Illness       Reason for visit:  Possible flare up of planus sclerosus and general post menapausal symptoms    She eats 2-3 servings of fruits and vegetables daily.She consumes 0 sweetened beverage(s) daily.She exercises with enough effort to increase her heart rate 10 to 19 minutes per day.  She exercises with enough effort to increase her heart rate 4 days per week.   She is taking medications regularly.     All of a sudden, 10# came on    Walking again    Knee pain - ?age; ices them, wears a brace, considering compression stockings    Fell and lots of bruising    Vaginal dryness  ?lichen sclerosis - clobetasol helped but it is 3 yrs old. Previously it itched a lot, now it stings. Is some of this the lack of estrogen?    Anus - flared and itches - no constipation - knows she has hemorrhoids      Objective    /70   Pulse 93   Temp 98  F (36.7  C) (Oral)   Resp 16   Ht 1.67 m (5' 5.75\")   Wt 63 kg (139 lb)   LMP  (LMP Unknown)   SpO2 99%   BMI 22.61 kg/m    Body mass index is 22.61 kg/m .  Physical Exam   GENERAL: alert and moderate emotional distress   (female): Mons - removed nearly all pubic hairs causing folliculitis  Labia majora - inner 1/2 of each fold is lightly whitened on the skin surface  Labia minora - right - swollen, slightly tender, no bleeding or drainage  Entroitus - ?ulcer right lower side, about 8mm in diameter and 1-2mm deep, no erythema (not sure it is an ulcer)  Vaginal mucosa - very pale and rectal exam irritated \"deflated\" hemorrhoids, none with excoriation or bleeding  MS: light bruises " on right anterior lower leg, no edema  SKIN: no suspicious lesions or rashes  PSYCH: inattentive, affect flat, anxious, speech pressured, judgement and insight intact, judgement and insight impaired, and appearance well groomed          Signed Electronically by: Lalitha Rivera MD

## 2024-10-03 ENCOUNTER — MYC REFILL (OUTPATIENT)
Dept: FAMILY MEDICINE | Facility: CLINIC | Age: 59
End: 2024-10-03
Payer: COMMERCIAL

## 2024-10-03 DIAGNOSIS — F90.0 ATTENTION DEFICIT HYPERACTIVITY DISORDER (ADHD), PREDOMINANTLY INATTENTIVE TYPE: ICD-10-CM

## 2024-10-03 DIAGNOSIS — Z76.0 ENCOUNTER FOR MEDICATION REFILL: ICD-10-CM

## 2024-10-04 RX ORDER — LISDEXAMFETAMINE DIMESYLATE 20 MG/1
20 CAPSULE ORAL EVERY MORNING
Qty: 28 CAPSULE | Refills: 0 | Status: SHIPPED | OUTPATIENT
Start: 2024-10-04 | End: 2024-11-02

## 2024-11-02 ENCOUNTER — MYC REFILL (OUTPATIENT)
Dept: FAMILY MEDICINE | Facility: CLINIC | Age: 59
End: 2024-11-02
Payer: COMMERCIAL

## 2024-11-02 DIAGNOSIS — F90.0 ATTENTION DEFICIT HYPERACTIVITY DISORDER (ADHD), PREDOMINANTLY INATTENTIVE TYPE: ICD-10-CM

## 2024-11-02 DIAGNOSIS — Z76.0 ENCOUNTER FOR MEDICATION REFILL: ICD-10-CM

## 2024-11-02 RX ORDER — LISDEXAMFETAMINE DIMESYLATE 20 MG/1
20 CAPSULE ORAL EVERY MORNING
Qty: 28 CAPSULE | Refills: 0 | Status: SHIPPED | OUTPATIENT
Start: 2024-11-02

## 2024-12-06 ENCOUNTER — LAB REQUISITION (OUTPATIENT)
Dept: LAB | Facility: CLINIC | Age: 59
End: 2024-12-06

## 2024-12-06 DIAGNOSIS — N95.1 MENOPAUSAL AND FEMALE CLIMACTERIC STATES: ICD-10-CM

## 2024-12-06 PROCEDURE — 80053 COMPREHEN METABOLIC PANEL: CPT | Performed by: OBSTETRICS & GYNECOLOGY

## 2024-12-06 PROCEDURE — 84075 ASSAY ALKALINE PHOSPHATASE: CPT | Performed by: OBSTETRICS & GYNECOLOGY

## 2024-12-06 PROCEDURE — 82565 ASSAY OF CREATININE: CPT | Performed by: OBSTETRICS & GYNECOLOGY

## 2024-12-07 LAB
ALBUMIN SERPL BCG-MCNC: 4.7 G/DL (ref 3.5–5.2)
ALP SERPL-CCNC: 70 U/L (ref 40–150)
ALT SERPL W P-5'-P-CCNC: 15 U/L (ref 0–50)
ANION GAP SERPL CALCULATED.3IONS-SCNC: 9 MMOL/L (ref 7–15)
AST SERPL W P-5'-P-CCNC: 27 U/L (ref 0–45)
BILIRUB SERPL-MCNC: 0.4 MG/DL
BUN SERPL-MCNC: 25.3 MG/DL (ref 6–20)
CALCIUM SERPL-MCNC: 9.8 MG/DL (ref 8.8–10.4)
CHLORIDE SERPL-SCNC: 101 MMOL/L (ref 98–107)
CREAT SERPL-MCNC: 0.87 MG/DL (ref 0.51–0.95)
EGFRCR SERPLBLD CKD-EPI 2021: 77 ML/MIN/1.73M2
GLUCOSE SERPL-MCNC: 81 MG/DL (ref 70–99)
HCO3 SERPL-SCNC: 27 MMOL/L (ref 22–29)
POTASSIUM SERPL-SCNC: 5 MMOL/L (ref 3.4–5.3)
PROT SERPL-MCNC: 7.4 G/DL (ref 6.4–8.3)
SODIUM SERPL-SCNC: 137 MMOL/L (ref 135–145)

## 2025-01-03 ENCOUNTER — MYC REFILL (OUTPATIENT)
Dept: FAMILY MEDICINE | Facility: CLINIC | Age: 60
End: 2025-01-03
Payer: COMMERCIAL

## 2025-01-03 DIAGNOSIS — Z76.0 ENCOUNTER FOR MEDICATION REFILL: ICD-10-CM

## 2025-01-03 DIAGNOSIS — F90.0 ATTENTION DEFICIT HYPERACTIVITY DISORDER (ADHD), PREDOMINANTLY INATTENTIVE TYPE: ICD-10-CM

## 2025-01-06 RX ORDER — LISDEXAMFETAMINE DIMESYLATE 20 MG/1
20 CAPSULE ORAL EVERY MORNING
Qty: 28 CAPSULE | Refills: 0 | Status: SHIPPED | OUTPATIENT
Start: 2025-01-06

## 2025-02-05 ENCOUNTER — MYC REFILL (OUTPATIENT)
Dept: FAMILY MEDICINE | Facility: CLINIC | Age: 60
End: 2025-02-05
Payer: COMMERCIAL

## 2025-02-05 DIAGNOSIS — Z76.0 ENCOUNTER FOR MEDICATION REFILL: ICD-10-CM

## 2025-02-05 DIAGNOSIS — F90.0 ATTENTION DEFICIT HYPERACTIVITY DISORDER (ADHD), PREDOMINANTLY INATTENTIVE TYPE: ICD-10-CM

## 2025-02-05 RX ORDER — LISDEXAMFETAMINE DIMESYLATE 20 MG/1
20 CAPSULE ORAL EVERY MORNING
Qty: 28 CAPSULE | Refills: 0 | Status: SHIPPED | OUTPATIENT
Start: 2025-02-05

## 2025-02-20 ENCOUNTER — PATIENT OUTREACH (OUTPATIENT)
Dept: CARE COORDINATION | Facility: CLINIC | Age: 60
End: 2025-02-20
Payer: COMMERCIAL

## 2025-02-20 SDOH — HEALTH STABILITY: PHYSICAL HEALTH: ON AVERAGE, HOW MANY DAYS PER WEEK DO YOU ENGAGE IN MODERATE TO STRENUOUS EXERCISE (LIKE A BRISK WALK)?: 2 DAYS

## 2025-02-20 SDOH — HEALTH STABILITY: PHYSICAL HEALTH: ON AVERAGE, HOW MANY MINUTES DO YOU ENGAGE IN EXERCISE AT THIS LEVEL?: 20 MIN

## 2025-02-20 ASSESSMENT — SOCIAL DETERMINANTS OF HEALTH (SDOH): HOW OFTEN DO YOU GET TOGETHER WITH FRIENDS OR RELATIVES?: TWICE A WEEK

## 2025-02-25 ENCOUNTER — OFFICE VISIT (OUTPATIENT)
Dept: FAMILY MEDICINE | Facility: CLINIC | Age: 60
End: 2025-02-25
Payer: COMMERCIAL

## 2025-02-25 VITALS
HEIGHT: 66 IN | RESPIRATION RATE: 20 BRPM | SYSTOLIC BLOOD PRESSURE: 102 MMHG | DIASTOLIC BLOOD PRESSURE: 64 MMHG | WEIGHT: 142 LBS | OXYGEN SATURATION: 99 % | TEMPERATURE: 98.6 F | HEART RATE: 79 BPM | BODY MASS INDEX: 22.82 KG/M2

## 2025-02-25 DIAGNOSIS — F90.0 ATTENTION DEFICIT HYPERACTIVITY DISORDER (ADHD), PREDOMINANTLY INATTENTIVE TYPE: ICD-10-CM

## 2025-02-25 DIAGNOSIS — E78.5 HYPERLIPIDEMIA LDL GOAL <100: ICD-10-CM

## 2025-02-25 DIAGNOSIS — Z76.0 ENCOUNTER FOR MEDICATION REFILL: ICD-10-CM

## 2025-02-25 DIAGNOSIS — F32.A CHRONIC DEPRESSION: ICD-10-CM

## 2025-02-25 DIAGNOSIS — Z00.00 PREVENTATIVE HEALTH CARE: Primary | ICD-10-CM

## 2025-02-25 PROBLEM — F32.1 CURRENT MODERATE EPISODE OF MAJOR DEPRESSIVE DISORDER, UNSPECIFIED WHETHER RECURRENT (H): Status: RESOLVED | Noted: 2025-02-25 | Resolved: 2025-02-25

## 2025-02-25 PROBLEM — F41.9 ANXIETY AND DEPRESSION: Status: RESOLVED | Noted: 2021-01-19 | Resolved: 2025-02-25

## 2025-02-25 PROBLEM — F32.1 CURRENT MODERATE EPISODE OF MAJOR DEPRESSIVE DISORDER, UNSPECIFIED WHETHER RECURRENT (H): Status: ACTIVE | Noted: 2025-02-25

## 2025-02-25 PROCEDURE — G2211 COMPLEX E/M VISIT ADD ON: HCPCS | Performed by: FAMILY MEDICINE

## 2025-02-25 PROCEDURE — 3078F DIAST BP <80 MM HG: CPT | Performed by: FAMILY MEDICINE

## 2025-02-25 PROCEDURE — 3074F SYST BP LT 130 MM HG: CPT | Performed by: FAMILY MEDICINE

## 2025-02-25 PROCEDURE — 99396 PREV VISIT EST AGE 40-64: CPT | Performed by: FAMILY MEDICINE

## 2025-02-25 PROCEDURE — 99214 OFFICE O/P EST MOD 30 MIN: CPT | Mod: 25 | Performed by: FAMILY MEDICINE

## 2025-02-25 RX ORDER — ATORVASTATIN CALCIUM 10 MG/1
10 TABLET, FILM COATED ORAL DAILY
Qty: 30 TABLET | Refills: 11 | Status: SHIPPED | OUTPATIENT
Start: 2025-02-25

## 2025-02-25 RX ORDER — FLUOXETINE HYDROCHLORIDE 40 MG/1
40 CAPSULE ORAL DAILY
Qty: 90 CAPSULE | Refills: 3 | Status: SHIPPED | OUTPATIENT
Start: 2025-02-25

## 2025-02-25 RX ORDER — LISDEXAMFETAMINE DIMESYLATE 20 MG/1
20 CAPSULE ORAL EVERY MORNING
Qty: 28 CAPSULE | Refills: 0 | Status: SHIPPED | OUTPATIENT
Start: 2025-02-25

## 2025-02-25 ASSESSMENT — PATIENT HEALTH QUESTIONNAIRE - PHQ9
SUM OF ALL RESPONSES TO PHQ QUESTIONS 1-9: 1
SUM OF ALL RESPONSES TO PHQ QUESTIONS 1-9: 1
10. IF YOU CHECKED OFF ANY PROBLEMS, HOW DIFFICULT HAVE THESE PROBLEMS MADE IT FOR YOU TO DO YOUR WORK, TAKE CARE OF THINGS AT HOME, OR GET ALONG WITH OTHER PEOPLE: NOT DIFFICULT AT ALL

## 2025-02-25 NOTE — PROGRESS NOTES
Preventive Care Visit  [facility] CLARISSA Rivera MD, Family Medicine  Feb 25, 2025      Assessment & Plan     Preventative health care  Generally doing well - MUCH better than last fall. Has issues to address.  - Vitamin D Deficiency    Hyperlipidemia LDL goal <100  Try statin, follow up with labs in a couple months, at which time we will recheck lipids and lipoprotein a. She has a strong family history of MI/stroke, to that is another reason to treat now.  - atorvastatin (LIPITOR) 10 MG tablet  Dispense: 30 tablet; Refill: 11  - Lipid panel reflex to direct LDL Non-fasting  - Lipoprotein (a)    Attention deficit hyperactivity disorder (ADHD), predominantly inattentive type  Continue same med, same dose. Hopefully it will become easier to obtain  - lisdexamfetamine (VYVANSE) 20 MG capsule  Dispense: 28 capsule; Refill: 0  - FLUoxetine (PROZAC) 40 MG capsule  Dispense: 90 capsule; Refill: 3    Chronic depression  Continue fluoxetine, same dose. She is not interested to change it. Also check vit D, supplement prn  - FLUoxetine (PROZAC) 40 MG capsule  Dispense: 90 capsule; Refill: 3  - Vitamin D Deficiency    Encounter for medication refill  - lisdexamfetamine (VYVANSE) 20 MG capsule  Dispense: 28 capsule; Refill: 0    Counseling  Appropriate preventive services were addressed with this patient via screening, questionnaire, or discussion as appropriate for fall prevention, nutrition, physical activity, Tobacco-use cessation, social engagement, weight loss and cognition.  Checklist reviewing preventive services available has been given to the patient.  Reviewed patient's diet, addressing concerns and/or questions.   She is at risk for lack of exercise and has been provided with information to increase physical activity for the benefit of her well-being.     On the DOS, I spent 20min on WCC and an additional 33min on depression, anxiety, ADHD, lipids and treatment,  etc                      Subjective   Kasia is a 59 year old, presenting for the following:  Physical        2/25/2025     8:03 AM   Additional Questions   Roomed by paw p   Accompanied by self          HPI  Had right cheek lesion removed by ENT/plastics  Also had sinus surgery - is still recovering but knows she is better.    Saw [facility] - her labia area is better even though she's not taken any meds prescribed.    No leg swelling      Health Care Directive  Patient does not have a Health Care Directive: Advance Directive received and scanned. Click on Code in the patient header to view.      2/20/2025   General Health   How would you rate your overall physical health? Good   Feel stress (tense, anxious, or unable to sleep) Only a little   (!) STRESS CONCERN      2/20/2025   Nutrition   Three or more servings of calcium each day? Yes   Diet: Regular (no restrictions)   How many servings of fruit and vegetables per day? (!) 2-3   How many sweetened beverages each day? 0-1         2/20/2025   Exercise   Days per week of moderate/strenous exercise 2 days   Average minutes spent exercising at this level 20 min   (!) EXERCISE CONCERN      2/20/2025   Social Factors   Frequency of gathering with friends or relatives Twice a week   Worry food won't last until get money to buy more No   Food not last or not have enough money for food? No   Do you have housing? (Housing is defined as stable permanent housing and does not include staying ouside in a car, in a tent, in an abandoned building, in an overnight shelter, or couch-surfing.) Yes   Are you worried about losing your housing? No   Lack of transportation? No   Unable to get utilities (heat,electricity)? No         2/20/2025   Fall Risk   Fallen 2 or more times in the past year? No   Trouble with walking or balance? No          2/20/2025   Dental   Dentist two times every year? Yes          Today's PHQ-9 Score:       2/25/2025     7:52 AM   PHQ-9 SCORE   PHQ-9 Total Score  MyChart 1 (Minimal depression)   PHQ-9 Total Score 1        Patient-reported         2/20/2025   Substance Use   Alcohol more than 3/day or more than 7/wk No   Do you use any other substances recreationally? No     Social History     Tobacco Use    Smoking status: Never     Passive exposure: Yes    Smokeless tobacco: Never    Tobacco comments:     passive and on/off in college   Vaping Use    Vaping status: Never Used   Substance Use Topics    Alcohol use: Yes     Comment: Alcoholic Drinks/day: family history of alcoholism    Drug use: No           8/17/2023   LAST FHS-7 RESULTS   1st degree relative breast or ovarian cancer No   Any relative bilateral breast cancer No   Any male have breast cancer No   Any ONE woman have BOTH breast AND ovarian cancer No   Any woman with breast cancer before 50yrs No   2 or more relatives with breast AND/OR ovarian cancer No   2 or more relatives with breast AND/OR bowel cancer No        Mammogram Screening - Mammogram every 1-2 years updated in Health Maintenance based on mutual decision making        2/20/2025   STI Screening   New sexual partner(s) since last STI/HIV test? No     History of abnormal Pap smear: YES - reflected in Problem List and Health Maintenance accordingly        Latest Ref Rng & Units 8/11/2023     7:58 AM 4/27/2021    11:02 AM 8/7/2019     3:06 PM   PAP / HPV   PAP  Negative for Intraepithelial Lesion or Malignancy (NILM)  Negative for squamous intraepithelial lesion or malignancy  Electronically signed by Priyanka Stringer CT (ASCP) on 5/4/2021 at 10:19 AM    Negative for squamous intraepithelial lesion or malignancy  Electronically signed by Anny Cochran CT (ASCP) on 8/15/2019 at 11:28 AM      HPV 16 DNA Negative Negative  Negative  Negative    HPV 18 DNA Negative Negative  Negative  Negative    Other HR HPV Negative Negative  Negative  Positive      ASCVD Risk   The 10-year ASCVD risk score (Nichole LAM, et al., 2019) is: 1.3%    Values  "used to calculate the score:      Age: 59 years      Sex: Female      Is Non- : No      Diabetic: No      Tobacco smoker: No      Systolic Blood Pressure: 102 mmHg      Is BP treated: No      HDL Cholesterol: 99 mg/dL      Total Cholesterol: 249 mg/dL         Reviewed and updated as needed this visit by Provider                    Past Medical History:   Diagnosis Date    Abnormal Pap smear of cervix 03/01/2019    ASCUS with \"other\" HPV positive; repeat later 2019    ADHD 01/01/2007    on Vyvanse; tried 2020 to taper off, but could not;    ADHD 01/01/2007    on Vyvanse; tried 2020 to taper off, but could not;    ASCUS with positive high risk HPV cervical 03/20/2019    1993 abnormal, had colposcopy  1998 NIL 6/10/09 ASCUS, neg HPV 6/14/12 NIL 3/20/19 ASCUS, +HR HPV (not 16/18) 8/7/19 NIL pap, +HR HPV (not 16/18) 4/27/21 NIL pap, neg HPV. Plan: cotest in 1 year    H/O mammogram 01/01/2019    BIRADS 2; do annual screen, next due 1/2020    Herpes genitalis in women 01/01/1988    HPV in female 08/01/2019    NOT 16 or 18, but \"other\"; next pap due 7/2020    Lichen sclerosus 01/01/2018 5/2018 possibly first seen; at pap 3/20/19 no evidence of it    Lichen sclerosus 01/01/2018 5/2018 possibly first seen; at pap 3/20/19 no evidence of it    Trichotillomania     goes in phases, sometimes worse, sometimes better; as of 3/20/19 better; fluoxetine 40mg helps     Labs reviewed in EPIC  BP Readings from Last 3 Encounters:   02/25/25 102/64   09/25/24 112/70   07/08/24 121/76    Wt Readings from Last 3 Encounters:   02/25/25 64.4 kg (142 lb)   09/25/24 63 kg (139 lb)   07/08/24 63.5 kg (140 lb)                  Current Outpatient Medications   Medication Sig Dispense Refill    atorvastatin (LIPITOR) 10 MG tablet Take 1 tablet (10 mg) by mouth daily. 30 tablet 11    FLUoxetine (PROZAC) 40 MG capsule Take 1 capsule (40 mg) by mouth daily. 90 capsule 3    lisdexamfetamine (VYVANSE) 20 MG capsule Take 1 " "capsule (20 mg) by mouth every morning. 28 capsule 0          Objective    Exam  /64   Pulse 79   Temp 98.6  F (37  C) (Oral)   Resp 20   Ht 1.674 m (5' 5.91\")   Wt 64.4 kg (142 lb)   LMP  (LMP Unknown)   SpO2 99%   BMI 22.98 kg/m     Estimated body mass index is 22.98 kg/m  as calculated from the following:    Height as of this encounter: 1.674 m (5' 5.91\").    Weight as of this encounter: 64.4 kg (142 lb).    Physical Exam  GENERAL: alert and no distress  EYES: Eyes grossly normal to inspection, PERRL and conjunctivae and sclerae normal  HENT: ear canals and TM's normal, nose and mouth without ulcers or lesions  NECK: no adenopathy, no asymmetry, masses, or scars  RESP: lungs clear to auscultation - no rales, rhonchi or wheezes  CV: regular rate and rhythm, normal S1 S2, no S3 or S4, no murmur, click or rub, no peripheral edema  ABDOMEN: soft, nontender, no hepatosplenomegaly, no masses and bowel sounds normal  MS: no gross musculoskeletal defects noted, no edema  SKIN: no suspicious lesions or rashes  NEURO: Normal strength and tone, mentation intact and speech normal  PSYCH: mentation appears normal, affect normal/bright, anxious, speech pressured, judgement and insight intact, and appearance well groomed        Signed Electronically by: Lalitha Rivera MD    Answers submitted by the patient for this visit:  Patient Health Questionnaire (Submitted on 2/25/2025)  If you checked off any problems, how difficult have these problems made it for you to do your work, take care of things at home, or get along with other people?: Not difficult at all  PHQ9 TOTAL SCORE: 1    "

## 2025-04-10 ENCOUNTER — MYC REFILL (OUTPATIENT)
Dept: FAMILY MEDICINE | Facility: CLINIC | Age: 60
End: 2025-04-10
Payer: COMMERCIAL

## 2025-04-10 DIAGNOSIS — F90.0 ATTENTION DEFICIT HYPERACTIVITY DISORDER (ADHD), PREDOMINANTLY INATTENTIVE TYPE: ICD-10-CM

## 2025-04-10 DIAGNOSIS — Z76.0 ENCOUNTER FOR MEDICATION REFILL: ICD-10-CM

## 2025-04-14 RX ORDER — LISDEXAMFETAMINE DIMESYLATE 20 MG/1
20 CAPSULE ORAL EVERY MORNING
Qty: 28 CAPSULE | Refills: 0 | Status: SHIPPED | OUTPATIENT
Start: 2025-04-14

## 2025-05-06 ENCOUNTER — THERAPY VISIT (OUTPATIENT)
Dept: SLEEP MEDICINE | Facility: CLINIC | Age: 60
End: 2025-05-06
Payer: COMMERCIAL

## 2025-05-06 ENCOUNTER — CARE COORDINATION (OUTPATIENT)
Dept: SLEEP MEDICINE | Facility: CLINIC | Age: 60
End: 2025-05-06
Payer: COMMERCIAL

## 2025-05-06 DIAGNOSIS — Z00.6 RESEARCH EXAM: Primary | ICD-10-CM

## 2025-05-06 DIAGNOSIS — Z00.6 RESEARCH EXAM: ICD-10-CM

## 2025-05-06 NOTE — PROGRESS NOTES
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  The patient is completing a overnight in laboratory PSG as part of research project.  The patient is at low risk of sleep disordered breathing. We do not expert her to have RBD.      Study is being completed to evaluated REM sleep motor tone.  Please let the patient sleep in as long as possible in the AM.  4-limb EMG montage.  Please keep video camera wide so that entire body is visible.

## 2025-05-07 NOTE — PROGRESS NOTES
A diagnostic study was performed per Dr. Jose Thomas's order. The patient participated in the 5HT RBD Study.

## 2025-05-14 ENCOUNTER — MYC REFILL (OUTPATIENT)
Dept: FAMILY MEDICINE | Facility: CLINIC | Age: 60
End: 2025-05-14
Payer: COMMERCIAL

## 2025-05-14 DIAGNOSIS — E78.5 HYPERLIPIDEMIA LDL GOAL <100: ICD-10-CM

## 2025-05-14 DIAGNOSIS — F90.0 ATTENTION DEFICIT HYPERACTIVITY DISORDER (ADHD), PREDOMINANTLY INATTENTIVE TYPE: ICD-10-CM

## 2025-05-14 DIAGNOSIS — Z76.0 ENCOUNTER FOR MEDICATION REFILL: ICD-10-CM

## 2025-05-14 RX ORDER — ATORVASTATIN CALCIUM 10 MG/1
10 TABLET, FILM COATED ORAL DAILY
Qty: 30 TABLET | Refills: 11 | OUTPATIENT
Start: 2025-05-14

## 2025-05-14 RX ORDER — LISDEXAMFETAMINE DIMESYLATE 20 MG/1
20 CAPSULE ORAL EVERY MORNING
Qty: 28 CAPSULE | Refills: 0 | Status: SHIPPED | OUTPATIENT
Start: 2025-05-14

## 2025-06-14 ENCOUNTER — MYC REFILL (OUTPATIENT)
Dept: FAMILY MEDICINE | Facility: CLINIC | Age: 60
End: 2025-06-14
Payer: COMMERCIAL

## 2025-06-14 DIAGNOSIS — Z76.0 ENCOUNTER FOR MEDICATION REFILL: ICD-10-CM

## 2025-06-14 DIAGNOSIS — F90.0 ATTENTION DEFICIT HYPERACTIVITY DISORDER (ADHD), PREDOMINANTLY INATTENTIVE TYPE: ICD-10-CM

## 2025-06-16 RX ORDER — LISDEXAMFETAMINE DIMESYLATE 20 MG/1
20 CAPSULE ORAL EVERY MORNING
Qty: 28 CAPSULE | Refills: 0 | Status: SHIPPED | OUTPATIENT
Start: 2025-06-16

## 2025-07-16 ENCOUNTER — MYC REFILL (OUTPATIENT)
Dept: FAMILY MEDICINE | Facility: CLINIC | Age: 60
End: 2025-07-16
Payer: COMMERCIAL

## 2025-07-16 DIAGNOSIS — F90.0 ATTENTION DEFICIT HYPERACTIVITY DISORDER (ADHD), PREDOMINANTLY INATTENTIVE TYPE: ICD-10-CM

## 2025-07-16 DIAGNOSIS — Z76.0 ENCOUNTER FOR MEDICATION REFILL: ICD-10-CM

## 2025-07-16 RX ORDER — LISDEXAMFETAMINE DIMESYLATE 20 MG/1
20 CAPSULE ORAL EVERY MORNING
Qty: 28 CAPSULE | Refills: 0 | Status: SHIPPED | OUTPATIENT
Start: 2025-07-16

## 2025-08-09 ENCOUNTER — PATIENT OUTREACH (OUTPATIENT)
Dept: CARE COORDINATION | Facility: CLINIC | Age: 60
End: 2025-08-09
Payer: COMMERCIAL

## 2025-08-15 PROBLEM — N20.0 CALCULUS OF KIDNEY: Status: ACTIVE | Noted: 2025-08-15

## 2025-08-15 PROBLEM — F51.04 PSYCHOPHYSIOLOGIC INSOMNIA: Status: ACTIVE | Noted: 2025-08-15

## 2025-08-18 ENCOUNTER — TRANSFERRED RECORDS (OUTPATIENT)
Dept: HEALTH INFORMATION MANAGEMENT | Facility: CLINIC | Age: 60
End: 2025-08-18
Payer: COMMERCIAL

## 2025-08-21 ENCOUNTER — OFFICE VISIT (OUTPATIENT)
Dept: PEDIATRICS | Facility: CLINIC | Age: 60
End: 2025-08-21
Payer: COMMERCIAL

## 2025-08-21 VITALS
TEMPERATURE: 97.5 F | OXYGEN SATURATION: 99 % | DIASTOLIC BLOOD PRESSURE: 69 MMHG | HEIGHT: 66 IN | WEIGHT: 132.8 LBS | BODY MASS INDEX: 21.34 KG/M2 | HEART RATE: 90 BPM | SYSTOLIC BLOOD PRESSURE: 113 MMHG | RESPIRATION RATE: 14 BRPM

## 2025-08-21 DIAGNOSIS — F32.A CHRONIC DEPRESSION: ICD-10-CM

## 2025-08-21 DIAGNOSIS — Z01.818 PREOP GENERAL PHYSICAL EXAM: Primary | ICD-10-CM

## 2025-08-21 DIAGNOSIS — N20.0 KIDNEY STONE: ICD-10-CM

## 2025-08-21 DIAGNOSIS — E05.90 HYPERTHYROIDISM: ICD-10-CM

## 2025-08-21 LAB
ANION GAP SERPL CALCULATED.3IONS-SCNC: 9 MMOL/L (ref 7–15)
BASOPHILS # BLD AUTO: 0.04 10E3/UL (ref 0–0.2)
BASOPHILS NFR BLD AUTO: 0.6 %
BUN SERPL-MCNC: 18.6 MG/DL (ref 8–23)
CALCIUM SERPL-MCNC: 9.7 MG/DL (ref 8.8–10.4)
CHLORIDE SERPL-SCNC: 102 MMOL/L (ref 98–107)
CREAT SERPL-MCNC: 0.88 MG/DL (ref 0.51–0.95)
EGFRCR SERPLBLD CKD-EPI 2021: 75 ML/MIN/1.73M2
EOSINOPHIL # BLD AUTO: 0.21 10E3/UL (ref 0–0.7)
EOSINOPHIL NFR BLD AUTO: 3.1 %
ERYTHROCYTE [DISTWIDTH] IN BLOOD BY AUTOMATED COUNT: 12.9 % (ref 10–15)
GLUCOSE SERPL-MCNC: 75 MG/DL (ref 70–99)
HCO3 SERPL-SCNC: 28 MMOL/L (ref 22–29)
HCT VFR BLD AUTO: 44.6 % (ref 35–47)
HGB BLD-MCNC: 14.6 G/DL (ref 11.7–15.7)
IMM GRANULOCYTES # BLD: <0.04 10E3/UL
IMM GRANULOCYTES NFR BLD: 0.1 %
LYMPHOCYTES # BLD AUTO: 1.38 10E3/UL (ref 0.8–5.3)
LYMPHOCYTES NFR BLD AUTO: 20.2 %
MCH RBC QN AUTO: 29.7 PG (ref 26.5–33)
MCHC RBC AUTO-ENTMCNC: 32.7 G/DL (ref 31.5–36.5)
MCV RBC AUTO: 90.8 FL (ref 78–100)
MONOCYTES # BLD AUTO: 0.46 10E3/UL (ref 0–1.3)
MONOCYTES NFR BLD AUTO: 6.7 %
NEUTROPHILS # BLD AUTO: 4.72 10E3/UL (ref 1.6–8.3)
NEUTROPHILS NFR BLD AUTO: 69.3 %
PLATELET # BLD AUTO: 297 10E3/UL (ref 150–450)
POTASSIUM SERPL-SCNC: 4.1 MMOL/L (ref 3.4–5.3)
RBC # BLD AUTO: 4.91 10E6/UL (ref 3.8–5.2)
SODIUM SERPL-SCNC: 139 MMOL/L (ref 135–145)
WBC # BLD AUTO: 6.82 10E3/UL (ref 4–11)

## 2025-08-21 PROCEDURE — 99213 OFFICE O/P EST LOW 20 MIN: CPT | Performed by: PHYSICIAN ASSISTANT

## 2025-08-21 PROCEDURE — 80048 BASIC METABOLIC PNL TOTAL CA: CPT | Performed by: PHYSICIAN ASSISTANT

## 2025-08-21 PROCEDURE — 3074F SYST BP LT 130 MM HG: CPT | Performed by: PHYSICIAN ASSISTANT

## 2025-08-21 PROCEDURE — 3078F DIAST BP <80 MM HG: CPT | Performed by: PHYSICIAN ASSISTANT

## 2025-08-21 PROCEDURE — 36415 COLL VENOUS BLD VENIPUNCTURE: CPT | Performed by: PHYSICIAN ASSISTANT

## 2025-08-21 PROCEDURE — 85025 COMPLETE CBC W/AUTO DIFF WBC: CPT | Performed by: PHYSICIAN ASSISTANT

## 2025-08-23 ENCOUNTER — MYC REFILL (OUTPATIENT)
Dept: FAMILY MEDICINE | Facility: CLINIC | Age: 60
End: 2025-08-23
Payer: COMMERCIAL

## 2025-08-23 DIAGNOSIS — F90.0 ATTENTION DEFICIT HYPERACTIVITY DISORDER (ADHD), PREDOMINANTLY INATTENTIVE TYPE: ICD-10-CM

## 2025-08-23 DIAGNOSIS — Z76.0 ENCOUNTER FOR MEDICATION REFILL: ICD-10-CM

## 2025-08-25 RX ORDER — LISDEXAMFETAMINE DIMESYLATE 20 MG/1
20 CAPSULE ORAL EVERY MORNING
Qty: 28 CAPSULE | Refills: 0 | Status: SHIPPED | OUTPATIENT
Start: 2025-08-25